# Patient Record
Sex: FEMALE | Race: WHITE | NOT HISPANIC OR LATINO | Employment: PART TIME | URBAN - METROPOLITAN AREA
[De-identification: names, ages, dates, MRNs, and addresses within clinical notes are randomized per-mention and may not be internally consistent; named-entity substitution may affect disease eponyms.]

---

## 2017-01-16 ENCOUNTER — ALLSCRIPTS OFFICE VISIT (OUTPATIENT)
Dept: OTHER | Facility: OTHER | Age: 52
End: 2017-01-16

## 2017-02-14 ENCOUNTER — GENERIC CONVERSION - ENCOUNTER (OUTPATIENT)
Dept: OTHER | Facility: OTHER | Age: 52
End: 2017-02-14

## 2017-03-20 ENCOUNTER — ALLSCRIPTS OFFICE VISIT (OUTPATIENT)
Dept: OTHER | Facility: OTHER | Age: 52
End: 2017-03-20

## 2017-03-20 DIAGNOSIS — F33.1 MAJOR DEPRESSIVE DISORDER, RECURRENT, MODERATE (HCC): ICD-10-CM

## 2017-03-25 ENCOUNTER — APPOINTMENT (OUTPATIENT)
Dept: LAB | Facility: CLINIC | Age: 52
End: 2017-03-25
Payer: COMMERCIAL

## 2017-03-25 ENCOUNTER — TRANSCRIBE ORDERS (OUTPATIENT)
Dept: LAB | Facility: CLINIC | Age: 52
End: 2017-03-25

## 2017-03-25 DIAGNOSIS — F33.1 MAJOR DEPRESSIVE DISORDER, RECURRENT, MODERATE (HCC): ICD-10-CM

## 2017-03-25 LAB
ALBUMIN SERPL BCP-MCNC: 4 G/DL (ref 3.5–5)
ALP SERPL-CCNC: 64 U/L (ref 46–116)
ALT SERPL W P-5'-P-CCNC: 24 U/L (ref 12–78)
AST SERPL W P-5'-P-CCNC: 18 U/L (ref 5–45)
BILIRUB DIRECT SERPL-MCNC: 0.12 MG/DL (ref 0–0.2)
BILIRUB SERPL-MCNC: 0.3 MG/DL (ref 0.2–1)
PROT SERPL-MCNC: 7.4 G/DL (ref 6.4–8.2)

## 2017-03-25 PROCEDURE — 80076 HEPATIC FUNCTION PANEL: CPT

## 2017-03-25 PROCEDURE — 36415 COLL VENOUS BLD VENIPUNCTURE: CPT

## 2017-05-09 ENCOUNTER — ALLSCRIPTS OFFICE VISIT (OUTPATIENT)
Dept: OTHER | Facility: OTHER | Age: 52
End: 2017-05-09

## 2017-05-23 ENCOUNTER — GENERIC CONVERSION - ENCOUNTER (OUTPATIENT)
Dept: OTHER | Facility: OTHER | Age: 52
End: 2017-05-23

## 2017-06-14 ENCOUNTER — ANESTHESIA EVENT (OUTPATIENT)
Dept: GASTROENTEROLOGY | Facility: HOSPITAL | Age: 52
End: 2017-06-14
Payer: COMMERCIAL

## 2017-06-14 ENCOUNTER — GENERIC CONVERSION - ENCOUNTER (OUTPATIENT)
Dept: OTHER | Facility: OTHER | Age: 52
End: 2017-06-14

## 2017-06-14 ENCOUNTER — ANESTHESIA (OUTPATIENT)
Dept: GASTROENTEROLOGY | Facility: HOSPITAL | Age: 52
End: 2017-06-14
Payer: COMMERCIAL

## 2017-06-14 ENCOUNTER — HOSPITAL ENCOUNTER (OUTPATIENT)
Facility: HOSPITAL | Age: 52
Setting detail: OUTPATIENT SURGERY
Discharge: HOME | End: 2017-06-14
Attending: INTERNAL MEDICINE | Admitting: INTERNAL MEDICINE
Payer: COMMERCIAL

## 2017-06-14 VITALS
HEIGHT: 62 IN | BODY MASS INDEX: 26.37 KG/M2 | TEMPERATURE: 96.8 F | HEART RATE: 62 BPM | OXYGEN SATURATION: 100 % | SYSTOLIC BLOOD PRESSURE: 110 MMHG | DIASTOLIC BLOOD PRESSURE: 61 MMHG | RESPIRATION RATE: 20 BRPM | WEIGHT: 143.3 LBS

## 2017-06-14 RX ORDER — PROPOFOL 10 MG/ML
INJECTION, EMULSION INTRAVENOUS AS NEEDED
Status: DISCONTINUED | OUTPATIENT
Start: 2017-06-14 | End: 2017-06-14 | Stop reason: SURG

## 2017-06-14 RX ORDER — PANTOPRAZOLE SODIUM 40 MG/1
40 TABLET, DELAYED RELEASE ORAL DAILY
Status: ON HOLD | COMMUNITY
End: 2017-06-14

## 2017-06-14 RX ORDER — LIDOCAINE HYDROCHLORIDE 10 MG/ML
INJECTION, SOLUTION INFILTRATION; PERINEURAL AS NEEDED
Status: DISCONTINUED | OUTPATIENT
Start: 2017-06-14 | End: 2017-06-14 | Stop reason: SURG

## 2017-06-14 RX ORDER — SODIUM CHLORIDE, SODIUM LACTATE, POTASSIUM CHLORIDE, CALCIUM CHLORIDE 600; 310; 30; 20 MG/100ML; MG/100ML; MG/100ML; MG/100ML
125 INJECTION, SOLUTION INTRAVENOUS CONTINUOUS
Status: DISCONTINUED | OUTPATIENT
Start: 2017-06-14 | End: 2017-06-14 | Stop reason: HOSPADM

## 2017-06-14 RX ORDER — DESVENLAFAXINE 100 MG/1
100 TABLET, EXTENDED RELEASE ORAL DAILY
Status: ON HOLD | COMMUNITY
End: 2017-06-14

## 2017-06-14 RX ORDER — DULOXETIN HYDROCHLORIDE 60 MG/1
60 CAPSULE, DELAYED RELEASE ORAL DAILY
COMMUNITY
End: 2018-03-16 | Stop reason: SDUPTHER

## 2017-06-14 RX ADMIN — PROPOFOL 20 MG: 10 INJECTION, EMULSION INTRAVENOUS at 08:12

## 2017-06-14 RX ADMIN — PROPOFOL 20 MG: 10 INJECTION, EMULSION INTRAVENOUS at 08:05

## 2017-06-14 RX ADMIN — PROPOFOL 20 MG: 10 INJECTION, EMULSION INTRAVENOUS at 08:07

## 2017-06-14 RX ADMIN — PROPOFOL 20 MG: 10 INJECTION, EMULSION INTRAVENOUS at 08:10

## 2017-06-14 RX ADMIN — PROPOFOL 20 MG: 10 INJECTION, EMULSION INTRAVENOUS at 08:20

## 2017-06-14 RX ADMIN — PROPOFOL 20 MG: 10 INJECTION, EMULSION INTRAVENOUS at 08:14

## 2017-06-14 RX ADMIN — PROPOFOL 20 MG: 10 INJECTION, EMULSION INTRAVENOUS at 08:18

## 2017-06-14 RX ADMIN — PROPOFOL 20 MG: 10 INJECTION, EMULSION INTRAVENOUS at 08:11

## 2017-06-14 RX ADMIN — PROPOFOL 20 MG: 10 INJECTION, EMULSION INTRAVENOUS at 08:16

## 2017-06-14 RX ADMIN — PROPOFOL 100 MG: 10 INJECTION, EMULSION INTRAVENOUS at 08:03

## 2017-06-14 RX ADMIN — SODIUM CHLORIDE, SODIUM LACTATE, POTASSIUM CHLORIDE, AND CALCIUM CHLORIDE 125 ML/HR: .6; .31; .03; .02 INJECTION, SOLUTION INTRAVENOUS at 07:56

## 2017-06-14 RX ADMIN — LIDOCAINE HYDROCHLORIDE 20 MG: 10 INJECTION, SOLUTION INFILTRATION; PERINEURAL at 08:03

## 2017-06-14 RX ADMIN — PROPOFOL 20 MG: 10 INJECTION, EMULSION INTRAVENOUS at 08:09

## 2017-06-29 ENCOUNTER — GENERIC CONVERSION - ENCOUNTER (OUTPATIENT)
Dept: OTHER | Facility: OTHER | Age: 52
End: 2017-06-29

## 2017-06-29 ENCOUNTER — ALLSCRIPTS OFFICE VISIT (OUTPATIENT)
Dept: OTHER | Facility: OTHER | Age: 52
End: 2017-06-29

## 2017-06-29 DIAGNOSIS — I10 ESSENTIAL (PRIMARY) HYPERTENSION: ICD-10-CM

## 2017-06-29 DIAGNOSIS — R73.01 IMPAIRED FASTING GLUCOSE: ICD-10-CM

## 2017-07-08 ENCOUNTER — APPOINTMENT (OUTPATIENT)
Dept: LAB | Facility: CLINIC | Age: 52
End: 2017-07-08
Payer: COMMERCIAL

## 2017-07-08 DIAGNOSIS — R73.01 IMPAIRED FASTING GLUCOSE: ICD-10-CM

## 2017-07-08 DIAGNOSIS — I10 ESSENTIAL (PRIMARY) HYPERTENSION: ICD-10-CM

## 2017-07-08 LAB
ALBUMIN SERPL BCP-MCNC: 4 G/DL (ref 3.5–5)
ALP SERPL-CCNC: 65 U/L (ref 46–116)
ALT SERPL W P-5'-P-CCNC: 19 U/L (ref 12–78)
ANION GAP SERPL CALCULATED.3IONS-SCNC: 6 MMOL/L (ref 4–13)
AST SERPL W P-5'-P-CCNC: 15 U/L (ref 5–45)
BASOPHILS # BLD AUTO: 0.03 THOUSANDS/ΜL (ref 0–0.1)
BASOPHILS NFR BLD AUTO: 1 % (ref 0–1)
BILIRUB SERPL-MCNC: 0.3 MG/DL (ref 0.2–1)
BUN SERPL-MCNC: 19 MG/DL (ref 5–25)
CALCIUM SERPL-MCNC: 9.3 MG/DL (ref 8.3–10.1)
CHLORIDE SERPL-SCNC: 105 MMOL/L (ref 100–108)
CHOLEST SERPL-MCNC: 213 MG/DL (ref 50–200)
CO2 SERPL-SCNC: 28 MMOL/L (ref 21–32)
CREAT SERPL-MCNC: 0.93 MG/DL (ref 0.6–1.3)
EOSINOPHIL # BLD AUTO: 0.18 THOUSAND/ΜL (ref 0–0.61)
EOSINOPHIL NFR BLD AUTO: 3 % (ref 0–6)
ERYTHROCYTE [DISTWIDTH] IN BLOOD BY AUTOMATED COUNT: 12.4 % (ref 11.6–15.1)
EST. AVERAGE GLUCOSE BLD GHB EST-MCNC: 140 MG/DL
GFR SERPL CREATININE-BSD FRML MDRD: >60 ML/MIN/1.73SQ M
GLUCOSE P FAST SERPL-MCNC: 107 MG/DL (ref 65–99)
HBA1C MFR BLD: 6.5 % (ref 4.2–6.3)
HCT VFR BLD AUTO: 37.3 % (ref 34.8–46.1)
HDLC SERPL-MCNC: 57 MG/DL (ref 40–60)
HGB BLD-MCNC: 12.1 G/DL (ref 11.5–15.4)
LDLC SERPL CALC-MCNC: 114 MG/DL (ref 0–100)
LYMPHOCYTES # BLD AUTO: 1.83 THOUSANDS/ΜL (ref 0.6–4.47)
LYMPHOCYTES NFR BLD AUTO: 30 % (ref 14–44)
MCH RBC QN AUTO: 29.4 PG (ref 26.8–34.3)
MCHC RBC AUTO-ENTMCNC: 32.4 G/DL (ref 31.4–37.4)
MCV RBC AUTO: 91 FL (ref 82–98)
MONOCYTES # BLD AUTO: 0.41 THOUSAND/ΜL (ref 0.17–1.22)
MONOCYTES NFR BLD AUTO: 7 % (ref 4–12)
NEUTROPHILS # BLD AUTO: 3.69 THOUSANDS/ΜL (ref 1.85–7.62)
NEUTS SEG NFR BLD AUTO: 59 % (ref 43–75)
PLATELET # BLD AUTO: 245 THOUSANDS/UL (ref 149–390)
PMV BLD AUTO: 10.4 FL (ref 8.9–12.7)
POTASSIUM SERPL-SCNC: 4.2 MMOL/L (ref 3.5–5.3)
PROT SERPL-MCNC: 7.3 G/DL (ref 6.4–8.2)
RBC # BLD AUTO: 4.12 MILLION/UL (ref 3.81–5.12)
SODIUM SERPL-SCNC: 139 MMOL/L (ref 136–145)
TRIGL SERPL-MCNC: 212 MG/DL
WBC # BLD AUTO: 6.14 THOUSAND/UL (ref 4.31–10.16)

## 2017-07-08 PROCEDURE — 80061 LIPID PANEL: CPT

## 2017-07-08 PROCEDURE — 80053 COMPREHEN METABOLIC PANEL: CPT

## 2017-07-08 PROCEDURE — 83036 HEMOGLOBIN GLYCOSYLATED A1C: CPT

## 2017-07-08 PROCEDURE — 36415 COLL VENOUS BLD VENIPUNCTURE: CPT

## 2017-07-08 PROCEDURE — 85025 COMPLETE CBC W/AUTO DIFF WBC: CPT

## 2017-09-26 DIAGNOSIS — Z12.31 ENCOUNTER FOR SCREENING MAMMOGRAM FOR MALIGNANT NEOPLASM OF BREAST: ICD-10-CM

## 2017-09-28 ENCOUNTER — ALLSCRIPTS OFFICE VISIT (OUTPATIENT)
Dept: OTHER | Facility: OTHER | Age: 52
End: 2017-09-28

## 2017-10-03 ENCOUNTER — HOSPITAL ENCOUNTER (OUTPATIENT)
Dept: MAMMOGRAPHY | Facility: HOSPITAL | Age: 52
Discharge: HOME | End: 2017-10-03
Payer: COMMERCIAL

## 2017-10-03 DIAGNOSIS — Z12.31 ENCOUNTER FOR SCREENING MAMMOGRAM FOR MALIGNANT NEOPLASM OF BREAST: ICD-10-CM

## 2017-10-03 PROCEDURE — 77063 BREAST TOMOSYNTHESIS BI: CPT

## 2017-10-03 PROCEDURE — G0202 SCR MAMMO BI INCL CAD: HCPCS

## 2017-11-21 ENCOUNTER — GENERIC CONVERSION - ENCOUNTER (OUTPATIENT)
Dept: OTHER | Facility: OTHER | Age: 52
End: 2017-11-21

## 2017-12-11 ENCOUNTER — ALLSCRIPTS OFFICE VISIT (OUTPATIENT)
Dept: OTHER | Facility: OTHER | Age: 52
End: 2017-12-11

## 2017-12-19 ENCOUNTER — ALLSCRIPTS OFFICE VISIT (OUTPATIENT)
Dept: OTHER | Facility: OTHER | Age: 52
End: 2017-12-19
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
## 2018-01-10 NOTE — MISCELLANEOUS
Message   Recorded as Task   Date: 02/14/2017 11:37 AM, Created By: Carlyn Echevarria   Task Name: Med Renewal Request   Assigned To: Nickie Andrew   Regarding Patient: Judy Arellano, Status: Active   CommentCharline Manriquez - 14 Feb 2017 11:37 AM     TASK CREATED  Caller: Self; Renew Medication; [phone] (Home); [phone] (Work)  Nathan Thompson has a refill of duloxetine on hold from January that was not [facility]  the pharmacy asked if you can send the script electronically to them so it can be filled today  I called Nathan Thompson on home phone # of record and she stated the [address] did not have her Duloxetine Rx there, although my Rx log reports the Rx I sent in 1/2016 as verified for transmission   I confirmed with [facility] that the Rx somehow did not make it there and I e-scribed a new one:  Duloxetine 30mg (1) cap po qd # 30      Plan  Recurrent major depressive episodes, moderate    · DULoxetine HCl - 30 MG Oral Capsule Delayed Release Particles; take 1 cap po  daily    Signatures   Electronically signed by : ANNABELLE Shah; Feb 14 2017  2:44PM EST                       (Author)
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
## 2018-01-11 NOTE — MISCELLANEOUS
Message     Recorded as Task   Date: 04/12/2017 11:48 AM, Created By: Lupe Holden   Task Name: Intake   Assigned To: GI Procedure,BIAN[facility] TEAM   Regarding Patient: Tracy Virgen, Status: Complete   Comment:    AddySheron scott - 12 Apr 2017 11:48 AM     TASK CREATED  Date: [  ]  Screened by: [  ]    Referring Provider: [  ]    Pre- Screening:   Has patient been referred for a routine screening Colonoscopy? Yes   Is the patient over 48years of age? Yes     If the answer is YES to both questions, proceed to the medical questions  Do you have a family history of colon cancer? No    Do you have a personal history of colon polyps? No    Do you have any of the following symptoms? Have you had a coronary or vascular stent within the last year? No    Have you had a heart attack or stroke in the last 6 months? No    Have you had intestinal surgery in the last 3 months? No    Do you have problems with:       Do you use:      Have you been hospitalized in the last Month? No    Have you been diagnosed with a bleeding disorder or anemia? No    Have you had chest pain (angina) or breathing problems  (COPD) in the last 3 months? No    Do you have any difficulty walking up a flight of stairs? No    Have you had Kidney failure or insufficiency? No    Have you had heart valve surgery? No    Are you confined to a wheelchair? No     Do you take,,,,, or other blood thinning medication? No    Have you been diagnosed with Diabetes or are you taking any   Diabetic medications? No    : If patient answers NO to medical questions, then schedule procedure  If patient answers YES to medical questions, then schedule office appointment  Previous Colonoscopy ( if yes)    Date and Facility of last colonoscopy?  [   ]    Patient scheduled for procedure: [  yes ]  Scheduled by: [  Misha Christensen ]  Date: [  ]  Time: [  ]  Provider: Laine Hughes    Location: [facility]    Referral Obtained for procedure: Were instructions Mailed? Was the prep sent to Pharmacy? Comments: [  ]   Jaxon Saenz - 12 Apr 2017 1:22 PM     TASK IN PROGRESS   Lana Green - 12 Apr 2017 1:23 PM     TASK EDITED  Left message on voice mail to contact the office to schedule colonoscopy  Chantel Ortiz - 21 Apr 2017 2:08 PM     TASK EDITED  CALLED PT TO SCHEDULE COLON SCREEN PER PT SHE WOULD LIKE TO SCHEDULE AT [facility]  PLEASE CALL PT TO SCHEDULE PROCEDURE  Peggysilvia Jackyoel - 57 May 2017 4:01 PM     TASK REASSIGNED: Previously Assigned To BEN VALENZUELA,TEAM  Pt  wants colon by the end of June, please  Please call her at [phone]   Tricia Bautista - 23 May 2017 2:27 PM     TASK COMPLETED        Active Problems    1  Encounter for mammogram to establish baseline mammogram (V76 12) (Z12 31)   2  Encounter for routine gynecological examination (V72 31) (Z01 419)   3  Encounter for screening colonoscopy (V76 51) (Z12 11)   4  HTN (hypertension), benign (401 1) (I10)   5  Influenza vaccine needed (V04 81) (Z23)   6  Other anxiety states (300 09) (F41 1)   7  Recurrent major depressive episodes, moderate (296 32) (F33 1)    Current Meds   1  Benazepril-Hydrochlorothiazide 10-12 5 MG Oral Tablet; TAKE 1 TABLET DAILY; Therapy: 93WFC3050 to (Evaluate:03Org1414)  Requested for: 37PTS0327; Last   Rx:23Mar2017 Ordered   2  DULoxetine HCl - 60 MG Oral Capsule Delayed Release Particles; take 1 capsule by   mouth daily; Therapy: 18BOJ3680 to (Evaluate:71Oyl5086)  Requested for: 60DPF4398; Last   TH:87GXN4740 Ordered   3  Pantoprazole Sodium 40 MG Oral Tablet Delayed Release; take 1 tablet by mouth once   daily; Therapy: 39Amp6750 to (Evaluate:20Nov2016)  Requested for: 06Dbv3584; Last   Rx:96Boq0349 Ordered    Allergies    1  No Known Drug Allergies    Plan  Encounter for screening colonoscopy    · Suprep Bowel Prep Oral Solution; USE AS DIRECTED   · COLONOSCOPY (GI, SURG);  Status:Active - Retrospective By Protocol Authorization;   Requested for:86Sgh0113;     Signatures   Electronically signed by : Marianne Conley, ; May 23 2017  2:33PM EST                       (Author)

## 2018-01-12 VITALS
HEIGHT: 62 IN | SYSTOLIC BLOOD PRESSURE: 122 MMHG | DIASTOLIC BLOOD PRESSURE: 62 MMHG | TEMPERATURE: 97.4 F | BODY MASS INDEX: 27.47 KG/M2 | RESPIRATION RATE: 16 BRPM | WEIGHT: 149.25 LBS | HEART RATE: 88 BPM

## 2018-01-12 VITALS
HEIGHT: 62 IN | SYSTOLIC BLOOD PRESSURE: 124 MMHG | HEART RATE: 68 BPM | DIASTOLIC BLOOD PRESSURE: 78 MMHG | BODY MASS INDEX: 27.7 KG/M2 | WEIGHT: 150.5 LBS

## 2018-01-12 NOTE — PROGRESS NOTES
Assessment    1  Depression with anxiety (300 4) (F41 8)   2  HTN (hypertension), benign (401 1) (I10)    Plan  Depression with anxiety    · DULoxetine HCl - 30 MG Oral Capsule Delayed Release Particles; TAKE 1  CAPSULE DAILY  Influenza vaccine needed    · Stop: Influenza  Recurrent major depressive episodes, moderate    · L-methylfolate Calcium 15 MG Oral Tablet   · Pristiq 100 MG Oral Tablet Extended Release 24 Hour    Discussion/Summary    1) HTN  - Instructed pt to only take combo pill  --> Assess BP on next HM f/u (has PAP & mammo ordered per pt)    2) Dep w/anxiety   - cont cymbalta  - cont psych f/u    3) HM  - f/u prn and annually  Self Referrals: No      Chief Complaint  BP recheck      History of Present Illness  45 yo F w/PMH HTN recently started on combo ACE 10/12 5 d/t  but has been taking the combo pill + ACE 10 d/t misunderstanding came to [place] [address] office for BP check, now w/     1) HTN  No sxs hypotension  HX mild glucose impairment @fasting 102  2) Depression w/anxiety  - recent rx change d/t lack of prior SNRI effectiveness- now on cymbalta 30, feeling comfortable and seeing psychiatrist regularly  Review of Systems    Constitutional: No fever, no chills, feels well, no tiredness, no recent weight gain or weight loss  Eyes: No complaints of eye pain, no red eyes, no eyesight problems, no discharge, no dry eyes, no itching of eyes  ENT: no complaints of earache, no loss of hearing, no nose bleeds, no nasal discharge, no sore throat, no hoarseness  Cardiovascular: No complaints of slow heart rate, no fast heart rate, no chest pain, no palpitations, no leg claudication, no lower extremity edema  Respiratory: No complaints of shortness of breath, no wheezing, no cough, no SOB on exertion, no orthopnea, no PND  Gastrointestinal: No complaints of abdominal pain, no constipation, no nausea or vomiting, no diarrhea, no bloody stools     Genitourinary: No complaints of dysuria, no incontinence, no pelvic pain, no dysmenorrhea, no vaginal discharge or bleeding  Musculoskeletal: No complaints of arthralgias, no myalgias, no joint swelling or stiffness, no limb pain or swelling  Integumentary: No complaints of skin rash or lesions, no itching, no skin wounds, no breast pain or lump  Neurological: No complaints of headache, no confusion, no convulsions, no numbness, no dizziness or fainting, no tingling, no limb weakness, no difficulty walking  Psychiatric: Not suicidal, no sleep disturbance, no anxiety or depression, no change in personality, no emotional problems  Endocrine: No complaints of proptosis, no hot flashes, no muscle weakness, no deepening of the voice, no feelings of weakness  Hematologic/Lymphatic: No complaints of swollen glands, no swollen glands in the neck, does not bleed easily, does not bruise easily  Active Problems    1  Encounter for mammogram to establish baseline mammogram (V76 12) (Z12 31)   2  Encounter for screening colonoscopy (V76 51) (Z12 11)   3  HTN (hypertension), benign (401 1) (I10)   4  Influenza vaccine needed (V04 81) (Z23)   5  Recurrent major depressive episodes, moderate (296 32) (F33 1)    Past Medical History    1  History of Gastric reflux (530 81) (K21 9)   2  History of hypertension (V12 59) (Z86 79)    Family History  Mother    1  Family history of cerebrovascular accident (CVA) (V17 1) (Z82 3)  Father    2  Family history of Major depressive disorder, recurrent episode, severe  Sister    3  Family history of Major depressive disorder, recurrent episode, severe  Paternal Aunt    4  Family history of Major depressive disorder, recurrent episode, severe    Social History    · Employed   ·    · Never A Smoker   · Number of children    Current Meds   1  Benazepril-Hydrochlorothiazide 10-12 5 MG Oral Tablet; TAKE 1 TABLET DAILY;    Therapy: 87BEI3519 to (L[place] Primes)  Requested for: 54UEV1279; Last SW:86YQK2563 Ordered   2  DULoxetine HCl - 30 MG Oral Capsule Delayed Release Particles; 1 cap po QD; Therapy: 73TEI6243 to (Evaluate:02Fia4192)  Requested for: 88FIF2244; Last   Rx:07Jun2016 Ordered   3  L-methylfolate Calcium 15 MG Oral Tablet; take one tablet by mouth every day; Therapy: 92EUI9469 to (Last Rx:29Mar2016)  Requested for: 29Mar2016 Ordered   4  Pantoprazole Sodium 40 MG Oral Tablet Delayed Release; take 1 tablet by mouth once   daily; Therapy: 36Gke9444 to (Evaluate:25Fws8639)  Requested for: 05Apr2016; Last   Rx:05Apr2016 Ordered   5  Pristiq 100 MG Oral Tablet Extended Release 24 Hour; take 1/2 of a tablet by mouth every   day for 1 more week; Therapy: 90NCF4897 to (Evaluate:08Jun2016)  Requested for: 94QPW8783; Last   Rx:07Jun2016 Ordered    Allergies    1  No Known Drug Allergies    Vitals  Vital Signs    Recorded: 59Lta2989 04:48PM Recorded: 66PFK8106 94:28UT   Systolic 791 346   Diastolic 70 60   Heart Rate  70   Respiration  16   Temperature  97 3 F   Height  5 ft 2 3 in   Weight  151 lb    BMI Calculated  27 35   BSA Calculated  1 7     Physical Exam    Constitutional   General appearance: No acute distress, well appearing and well nourished  Pulmonary   Respiratory effort: No increased work of breathing or signs of respiratory distress  Auscultation of lungs: Clear to auscultation  Cardiovascular   Auscultation of heart: Normal rate and rhythm, normal S1 and S2, without murmurs  Examination of extremities for edema and/or varicosities: Normal     Abdomen   Abdomen: Non-tender, no masses  Liver and spleen: No hepatomegaly or splenomegaly  Psychiatric   Orientation to person, place, and time: Normal     Mood and affect: Normal          Attending Note  Attending Note: I discussed the case with the Resident and reviewed the Resident's note, I supervised the Resident and I agree with the Resident management plan as it was presented to me   Level of Participation: I was present in clinic, but did not examine the patient        Future Appointments    Date/Time Provider Specialty Site   09/06/2016 05:00 PM ANNABELLE Cummings Psychiatry [facility] 81     Signatures   Electronically signed by : Pierce Beach DO; Jul 15 2016  5:06PM EST                       (Author)    Electronically signed by : Jackie Bello DO; Jul 26 2016  9:01AM EST                       (Author)
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
## 2018-01-14 VITALS
DIASTOLIC BLOOD PRESSURE: 73 MMHG | HEART RATE: 88 BPM | BODY MASS INDEX: 26.87 KG/M2 | WEIGHT: 146 LBS | HEIGHT: 62 IN | SYSTOLIC BLOOD PRESSURE: 113 MMHG
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
## 2018-01-15 VITALS
SYSTOLIC BLOOD PRESSURE: 132 MMHG | BODY MASS INDEX: 27.6 KG/M2 | HEIGHT: 62 IN | WEIGHT: 150 LBS | DIASTOLIC BLOOD PRESSURE: 86 MMHG | HEART RATE: 73 BPM

## 2018-01-15 NOTE — MISCELLANEOUS
Message   Recorded as Task   Date: 08/23/2016 09:25 AM, Created By: Carol Silva   Task Name: Med Renewal Request   Assigned To: Daniel Quick   Regarding Patient: Sravani Rogers, Status: Active   Comment:    Katherine Goldberg - 23 Aug 2016 9:25 AM     TASK CREATED  Cymbalta 30mg, qd, Orvamsi called again for her Duloxetine, but instead would like it to go to the [facility]  I confirmed with this pharmacy that Dr Jerad Fuentes Rx is not in the system   I gave a verbal order for the following, in case the [employer] system would not send:  Duloxetine 30mg (1) cap po qd # 30      Plan  Recurrent major depressive episodes, moderate    · DULoxetine HCl - 30 MG Oral Capsule Delayed Release Particles; 1 cap po QD    Signatures   Electronically signed by : ANNABELLE Silveira; Aug 23 2016  5:30PM EST                       (Author)
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
## 2018-01-18 NOTE — PSYCH
Message  Message Free Text Note Form: Covering for Dr Patti Choi  Renewed Pristiq 100 mg daily and Deplin 15 mg daily  90 day supply      Active Problems    1  Recurrent major depressive episodes, moderate (296 32) (F33 1)    Current Meds   1  Benazepril HCl - 10 MG Oral Tablet; TAKE 1 TABLET DAILY AS DIRECTED; Therapy: 81Lpg2690 to (Evaluate:80Awj3480); Last Rx:87Ivq3863 Ordered   2  Deplin 15 15-90 314 MG Oral Capsule; take 1 capsule daily; Therapy: 77GBY1612 to (Rashawn Buffalo)  Requested for: 38Fts2295; Last   Rx:08Vse3084 Ordered   3  Pantoprazole Sodium 40 MG Oral Tablet Delayed Release; take 1 tablet by mouth once   daily; Therapy: 32Dal7413 to (Evaluate:24Nov2015); Last Rx:58Adb2347 Ordered   4  Pristiq 100 MG Oral Tablet Extended Release 24 Hour; Take 1 tablet daily; Therapy: 77LWE6372 to (Evaluate:18Feb2016)  Requested for: 92XAS8158; Last   Rx:20Nov2015 Ordered    Allergies    1  No Known Drug Allergies    Plan    1  Deplin 15 15-90 314 MG Oral Capsule; take 1 capsule daily   2  Pristiq 100 MG Oral Tablet Extended Release 24 Hour;  Take 1 tablet daily    Signatures   Electronically signed by : Duglas Man MD; Mar 24 2016 10:56AM EST                       (Author)

## 2018-01-18 NOTE — MISCELLANEOUS
Message   Recorded as Task   Date: 08/22/2016 10:37 AM, Created By: iVpul Jimenez   Task Name: Med Renewal Request   Assigned To: Dirk Schmitz   Regarding Patient: Erin Narvaez, Status: Active   Comment:    Vipul Jimenez - 22 Aug 2016 10:37 AM     TASK CREATED  Caller: Self; Renew Medication; [phone] (Home); [phone] (Work)  pt id requestining refills on   Cymbalta 30mg    pt states she has no refills  homestadeborah gilbert  next appt 09/06/2016  in All scripts it shows 5 refills   Quintin Gramajo left msg requesting more Cymbalta, but Rx log shows her PMD office of Dr Vidhya Mix recorded a Rx apparently given for a 30 day supply with 5 refills on 7/15/2016 I called the Abdiaziz Odom at confirmed with pharmacist that there is no new Cymbalta Rx from 7/2016  That pharmacist then directed me to [facility] where Pt is now going for her Rx, however, they were closed for the day  I then called the Pt on home phone # of record and discussed the above  Pt will call the [address] tomorrow and talk to PMD as needed to find out what happened to the Rx        Signatures   Electronically signed by : ANNABELLE Shannon; Aug 22 2016  6:38PM EST                       (Author)

## 2018-01-22 VITALS
SYSTOLIC BLOOD PRESSURE: 122 MMHG | WEIGHT: 144 LBS | BODY MASS INDEX: 26.5 KG/M2 | HEIGHT: 62 IN | DIASTOLIC BLOOD PRESSURE: 70 MMHG

## 2018-01-22 VITALS
BODY MASS INDEX: 27.23 KG/M2 | SYSTOLIC BLOOD PRESSURE: 134 MMHG | HEART RATE: 65 BPM | WEIGHT: 148 LBS | DIASTOLIC BLOOD PRESSURE: 76 MMHG | HEIGHT: 62 IN

## 2018-01-23 VITALS — HEIGHT: 62 IN | WEIGHT: 142 LBS | BODY MASS INDEX: 26.13 KG/M2
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
## 2018-01-23 NOTE — MISCELLANEOUS
Provider Comments  Provider Comments:   patient did not show for scheduled appointment today  Signatures   Electronically signed by :  Yolande Goncalves, ; Dec 20 2017 12:03PM EST                       (Author)

## 2018-02-14 ENCOUNTER — TELEPHONE (OUTPATIENT)
Dept: OBGYN CLINIC | Facility: CLINIC | Age: 53
End: 2018-02-14

## 2018-02-14 DIAGNOSIS — N95.2 VAGINAL ATROPHY: Primary | ICD-10-CM

## 2018-02-14 RX ORDER — ESTRADIOL 10 UG/1
10 INSERT VAGINAL 2 TIMES WEEKLY
Qty: 8 TABLET | Refills: 2 | Status: SHIPPED | OUTPATIENT
Start: 2018-02-15 | End: 2018-12-12 | Stop reason: SDUPTHER

## 2018-02-14 NOTE — TELEPHONE ENCOUNTER
Pt here 11/2017 & discussed lubricant for vaginal atrophy - she feels she has dryness continually & has been using Astroglide with intercourse & still having discomfort - had prev discussed using vaginal estrogen & is willing to try if you are recommending

## 2018-03-07 NOTE — PSYCH
Message  Patient No Show Letter - Behavioral Health:     Date: 09/07/2016     Dear Adilson Waldron,     We missed seeing you for a scheduled appointment at [facility] on 9-6-16 at [address] [facility] with Kendal Brunson  Our goal is to offer the best possible care to our patients, so we are concerned when you are unable to keep a scheduled appointment  Please call us at [phone] so that we can reschedule the appointment for a date and time that will work for you  We understand that circumstances may arise which make it impossible for you to keep a scheduled appointment  Should this happen in the future, please call us as soon as you know the appointment will be missed  The earlier you let us know, the more likely we can offer your scheduled appointment time to another patient  We hope to hear from you soon       Sincerely,   Kendal ALANIS      Signatures   Electronically signed by : ANNABELLE Georges; Sep  7 2016  9:14AM EST                       (Author)

## 2018-03-13 PROBLEM — F41.1 ANXIETY STATE: Status: ACTIVE | Noted: 2017-03-20

## 2018-03-13 PROBLEM — G47.00 INSOMNIA: Status: ACTIVE | Noted: 2017-09-28

## 2018-03-13 PROBLEM — G25.81 RESTLESS LEGS SYNDROME: Status: ACTIVE | Noted: 2017-09-28

## 2018-03-16 ENCOUNTER — OFFICE VISIT (OUTPATIENT)
Dept: PSYCHIATRY | Facility: CLINIC | Age: 53
End: 2018-03-16
Payer: COMMERCIAL

## 2018-03-16 VITALS — WEIGHT: 133.5 LBS | HEIGHT: 62 IN | BODY MASS INDEX: 24.56 KG/M2

## 2018-03-16 DIAGNOSIS — I10 HTN (HYPERTENSION), BENIGN: ICD-10-CM

## 2018-03-16 DIAGNOSIS — F33.1 RECURRENT MAJOR DEPRESSIVE EPISODES, MODERATE (HCC): ICD-10-CM

## 2018-03-16 DIAGNOSIS — G47.00 INSOMNIA: ICD-10-CM

## 2018-03-16 DIAGNOSIS — F41.1 ANXIETY STATE: Primary | ICD-10-CM

## 2018-03-16 PROCEDURE — 99214 OFFICE O/P EST MOD 30 MIN: CPT | Performed by: PHYSICIAN ASSISTANT

## 2018-03-16 RX ORDER — HYDROXYZINE HYDROCHLORIDE 25 MG/1
TABLET, FILM COATED ORAL
Qty: 180 TABLET | Refills: 0 | Status: SHIPPED | OUTPATIENT
Start: 2018-03-16 | End: 2020-03-03

## 2018-03-16 RX ORDER — HYDROXYZINE HYDROCHLORIDE 25 MG/1
TABLET, FILM COATED ORAL
COMMUNITY
Start: 2017-12-11 | End: 2018-03-16 | Stop reason: SDUPTHER

## 2018-03-16 RX ORDER — DULOXETIN HYDROCHLORIDE 60 MG/1
60 CAPSULE, DELAYED RELEASE ORAL DAILY
Qty: 90 CAPSULE | Refills: 0 | Status: SHIPPED | OUTPATIENT
Start: 2018-03-16 | End: 2018-06-18 | Stop reason: SDUPTHER
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
## 2018-03-28 ENCOUNTER — TELEPHONE (OUTPATIENT)
Dept: BEHAVIORAL HEALTH UNIT | Facility: HOSPITAL | Age: 53
End: 2018-03-28

## 2018-03-28 NOTE — TELEPHONE ENCOUNTER
----- Message from Ethan Christopher MA sent at 3/20/2018 12:00 PM EDT -----  Regarding: cx appt   Was her appt for 3/15/18 to be cancelled, since you saw her 3/16/18 and did a Tx Plan

## 2018-04-24 ENCOUNTER — OFFICE VISIT (OUTPATIENT)
Dept: FAMILY MEDICINE CLINIC | Facility: CLINIC | Age: 53
End: 2018-04-24
Payer: COMMERCIAL

## 2018-04-24 VITALS
TEMPERATURE: 98.5 F | BODY MASS INDEX: 25.58 KG/M2 | RESPIRATION RATE: 18 BRPM | WEIGHT: 139 LBS | HEART RATE: 72 BPM | HEIGHT: 62 IN | DIASTOLIC BLOOD PRESSURE: 70 MMHG | SYSTOLIC BLOOD PRESSURE: 110 MMHG

## 2018-04-24 DIAGNOSIS — R73.09 ELEVATED HEMOGLOBIN A1C: ICD-10-CM

## 2018-04-24 DIAGNOSIS — I10 HTN (HYPERTENSION), BENIGN: Primary | ICD-10-CM

## 2018-04-24 PROCEDURE — 3078F DIAST BP <80 MM HG: CPT | Performed by: FAMILY MEDICINE

## 2018-04-24 PROCEDURE — 3074F SYST BP LT 130 MM HG: CPT | Performed by: FAMILY MEDICINE

## 2018-04-24 PROCEDURE — 99214 OFFICE O/P EST MOD 30 MIN: CPT | Performed by: FAMILY MEDICINE

## 2018-04-24 NOTE — PROGRESS NOTES
Susi Rojas 1965 female MRN: [id number]    Family Medicine Follow-up Visit    ASSESSMENT/PLAN  Problem List Items Addressed This Visit     HTN (hypertension), benign - Primary    Relevant Medications    benazepril-hydrochlorthiazide (LOTENSIN HCT) 10-12 5 MG per tablet    Other Relevant Orders    Comprehensive metabolic panel    Lipid panel    Elevated hemoglobin A1c    Relevant Orders    HEMOGLOBIN A1C W/ EAG ESTIMATION       DASH DIET   F/U 6 months    Future Appointments  Date Time Provider Yandy Laguerre   6/18/2018 10:30 AM Omi Masters PA-C [facility]          SUBJECTIVE  CC: Hypertension      HPI:  Susi Rojas is a 52 y o  female who presents for follow up HTN/ elevated A1c " Not exercizing enough" - 2 days/week  Has cut back on non nutritious foods  Taking medications without adverse effects    Denies CP/SOB/HA/Edema  Denies polyuria/dypsia/phagia    Review of Systems   Constitutional: Negative for appetite change, fatigue, fever and unexpected weight change  Respiratory: Negative for cough, chest tightness and shortness of breath  Cardiovascular: Negative  Endocrine: Negative  Musculoskeletal: Negative for arthralgias and myalgias  Psychiatric/Behavioral: Negative for suicidal ideas  F/b counselor and psychiatry       Historical Information   The patient history was reviewed as follows:    Past Medical History:   Diagnosis Date    GERD (gastroesophageal reflux disease)     Hypertension     Restless legs      Past Surgical History:   Procedure Laterality Date    COLPOPEXY VAGINAL EXTRAPERITONEAL (VEC) WITH INSERTION PUBOVAGINAL SLING      NASAL SEPTUM SURGERY      AR COLONOSCOPY FLX DX W/COLLJ SPEC WHEN PFRMD N/A 6/14/2017    Procedure: COLONOSCOPY;  Surgeon: Mian Valenzuela MD;  [facility];   Service: Gastroenterology    TONSILLECTOMY      TUBAL LIGATION       Family History   Problem Relation Age of Onset    Stroke Mother Cerebrovascular accident (CVA)    Depression Father      Major depressive disorder, recurrent episode, severe    Depression Sister      Major depressive disorder, recurrent episode, severe    Depression Paternal Aunt      Major depressive disorder, recurrent episode, severe      Social History   History   Alcohol Use No     Comment: Pt denies any h/o ETOH or illicit drug use or abuse  History   Drug Use No     Comment: Pt denies any h/o ETOH or illicit drug use or abuse  History   Smoking Status    Never Smoker   Smokeless Tobacco    Never Used       Medications:     Current Outpatient Prescriptions:     benazepril-hydrochlorthiazide (LOTENSIN HCT) 10-12 5 MG per tablet, Take 1 tablet by mouth daily, Disp: 90 tablet, Rfl: 3    DULoxetine (CYMBALTA) 60 mg delayed release capsule, Take 1 capsule (60 mg total) by mouth daily for 90 days, Disp: 90 capsule, Rfl: 0    Estradiol (VAGIFEM) 10 MCG TABS, Insert 1 tablet (10 mcg total) into the vagina 2 (two) times a week, Disp: 8 tablet, Rfl: 2    hydrOXYzine HCL (ATARAX) 25 mg tablet, Take 1 tab by mouth daily to twice daily as needed for anxiety or insomnia, Disp: 180 tablet, Rfl: 0  No Known Allergies    OBJECTIVE    Vitals:   Vitals:    04/24/18 0936   BP: 110/70   Pulse: 72   Resp: 18   Temp: 98 5 °F (36 9 °C)   Weight: 63 kg (139 lb)   Height: 5' 2" (1 575 m)           Physical Exam   Constitutional: She is oriented to person, place, and time  She appears well-developed and well-nourished  HENT:   Head: Normocephalic and atraumatic  Mouth/Throat: Oropharynx is clear and moist    Eyes: Conjunctivae are normal  Pupils are equal, round, and reactive to light  No scleral icterus  Neck: No JVD present  No thyromegaly present  Cardiovascular: Normal rate, regular rhythm, normal heart sounds and intact distal pulses  Pulmonary/Chest: Effort normal and breath sounds normal    Musculoskeletal: She exhibits no edema     Lymphadenopathy:     She has no cervical adenopathy  Neurological: She is alert and oriented to person, place, and time  Skin: Skin is warm and dry  Psychiatric: She has a normal mood and affect                    Abram Urena, [address] [place] [address]   4/24/2018

## 2018-04-24 NOTE — PATIENT INSTRUCTIONS
DASH Eating Plan   AMBULATORY CARE:   The DASH (Dietary Approaches to Stop Hypertension) Eating Plan  is designed to help prevent or lower high blood pressure  It can also help to lower LDL (bad) cholesterol and decrease your risk for heart disease  The plan is low in sodium, sugar, unhealthy fats, and total fat  It is high in potassium, calcium, magnesium, and fiber  These nutrients are added when you eat more fruits, vegetables, and whole grains  Your sodium limit each day: Your dietitian will tell you how much sodium is safe for you to have each day  People with high blood pressure should have no more than 1,500 to 2,300 mg of sodium in a day  A teaspoon (tsp) of salt has 2,300 mg of sodium  This may seem like a difficult goal, but small changes to the foods you eat can make a big difference  Your healthcare provider or dietitian can help you create a meal plan that follows your sodium limit  How to limit sodium:   · Read food labels  Food labels can help you choose foods that are low in sodium  The amount of sodium is listed in milligrams (mg)  The % Daily Value (DV) column tells you how much of your daily needs are met by 1 serving of the food for each nutrient listed  Choose foods that have less than 5% of the DV of sodium  These foods are considered low in sodium  Foods that have 20% or more of the DV of sodium are considered high in sodium  Avoid foods that have more than 300 mg of sodium in each serving  Choose foods that say low-sodium, reduced-sodium, or no salt added on the food label  · Avoid salt  Do not salt food at the table, and add very little salt to foods during cooking  Use herbs and spices, such as onions, garlic, and salt-free seasonings to add flavor to foods  Try lemon or lime juice or vinegar to give foods a tart flavor  Use hot peppers or a small amount of hot pepper sauce to add a spicy flavor to foods  · Ask about salt substitutes    Ask your healthcare provider if you may use salt substitutes  Some salt substitutes have ingredients that can be harmful if you have certain health conditions  · Choose foods carefully at restaurants  Meals from restaurants, especially fast food restaurants, are often high in sodium  Some restaurants have nutrition information that tells you the amount of sodium in their foods  Ask to have your food prepared with less, or no salt  What you need to know about fats:   · Include healthy fats  Examples are unsaturated fats and omega-3 fatty acids  Unsaturated fats are found in soybean, canola, olive, or sunflower oil, and liquid and soft tub margarines  Omega-3 fatty acids are found in fatty fish, such as salmon, tuna, mackerel, and sardines  It is also found in flaxseed oil and ground flaxseed  · Avoid unhealthy fats  Do not eat unhealthy fats, such as saturated fats and trans fats  Saturated fats are found in foods that contain fat from animals  Examples are fatty meats, whole milk, butter, cream, and other dairy foods  It is also found in shortening, stick margarine, palm oil, and coconut oil  Trans fats are found in fried foods, crackers, chips, and baked goods made with margarine or shortening  Foods to include: With the DASH eating plan, you need to eat a certain number of servings from each food group  This will help you get enough of certain nutrients and limit others  The amount of servings you should eat depends on how many calories you need  Your dietitian can tell you how many calories you need  The number of servings listed next to the food groups below are for people who need about 2,000 calories each day    · Grains:  6 to 8 servings (3 of these servings should be whole-grain foods)    ¨ 1 slice of whole-grain bread     ¨ 1 ounce of dry cereal    ¨ ½ cup of cooked cereal, pasta, or brown rice    · Vegetables and fruits:  4 to 5 servings of fruits and 4 to 5 servings of vegetables    ¨ 1 medium fruit    ¨ ½ cup of frozen, canned (no added salt), or chopped fresh vegetables     ¨ ½ cup of fresh, frozen, dried, or canned fruit (canned in light syrup or fruit juice)    ¨ ½ cup of vegetable or fruit juice    · Dairy:  2 to 3 servings    ¨ 1 cup of nonfat (skim) or 1% milk    ¨ 1½ ounces of fat-free or low-fat cheese    ¨ 6 ounces of nonfat or low-fat yogurt    · Lean meat, poultry, and fish:  6 ounces or less    [employer] (chicken, turkey) with no skin    ¨ Fish (especially fatty fish, such as salmon, fresh tuna, or mackerel)    ¨ Lean beef and pork (loin, round, extra lean hamburger)    ¨ Egg whites and egg substitutes    · Nuts, seeds, and legumes:  4 to 5 servings each week    ¨ ½ cup of cooked beans and peas    ¨ 1½ ounces of unsalted nuts    ¨ 2 tablespoons of peanut butter or seeds    · Sweets and added sugars:  5 or less each week    ¨ 1 tablespoon of sugar, jelly, or jam    ¨ ½ cup of sorbet or gelatin    ¨ 1 cup of lemonade    · Fats:  2 to 3 servings each week    ¨ 1 teaspoon of soft margarine or vegetable oil    ¨ 1 tablespoon of mayonnaise    ¨ 2 tablespoons of salad dressing  Foods to avoid:   · Grains:      [employer], such as doughnuts, pastries, cookies, and biscuits (high in fat and sugar)    ¨ Mixes for cornbread and biscuits, packaged foods, such as bread stuffing, rice and pasta mixes, macaroni and cheese, and instant cereals (high in sodium)    · Fruits and vegetables:      ¨ Regular, canned vegetables (high in sodium)    ¨ Sauerkraut, pickled vegetables, and other foods prepared in brine (high in sodium)    ¨ Fried vegetables or vegetables in butter or high-fat sauces    ¨ Fruit in cream or butter sauce (high in fat)    · Dairy:      ¨ Whole milk, 2% milk, and cream (high in fat)    ¨ Regular cheese and processed cheese (high in fat and sodium)    · Meats and protein foods:      ¨ Smoked or cured meat, such as corned beef, solares, ham, hot dogs, and sausage (high in fat and sodium)    ¨ Canned beans and canned meats or spreads, such as potted meats, sardines, anchovies, and imitation seafood (high in sodium)    ¨ Deli or lunch meats, such as bologna, ham, turkey, and roast beef (high in sodium)    ¨ High-fat meat (T-bone steak, regular hamburger, and ribs)    ¨ Whole eggs and egg yolks (high in fat)    · Other:      ¨ Seasonings made with salt, such as garlic salt, celery salt, onion salt, seasoned salt, meat tenderizers, and monosodium glutamate (MSG)    ¨ Miso soup and canned or dried soup mixes (high in sodium)    ¨ Regular soy sauce, barbecue sauce, teriyaki sauce, steak sauce, Worcestershire sauce, and most flavored vinegars (high in sodium)    ¨ Regular condiments, such as mustard, ketchup, and salad dressings (high in sodium)    ¨ Gravy and sauces, such as Sammy or cheese sauces (high in sodium and fat)    ¨ Drinks high in sugar, such as soda or fruit drinks    [employer] foods, such as salted chips, popcorn, pretzels, pork rinds, salted crackers, and salted nuts    ¨ Frozen foods, such as dinners, entrees, vegetables with sauces, and breaded meats (high in sodium)  Other guidelines to follow:   · Maintain a healthy weight  Your risk for heart disease is higher if you are overweight  Your healthcare provider may suggest that you lose weight if you are overweight  You can lose weight by eating fewer calories and foods that have added sugars and fat  The DASH meal plan can help you do this  Decrease calories by eating smaller portions at each meal and fewer snacks  Ask your healthcare provider for more information about how to lose weight  · Exercise regularly  Regular exercise can help you reach or maintain a healthy weight  Regular exercise can also help decrease your blood pressure and improve your cholesterol levels  Get 30 minutes or more of moderate exercise each day of the week  To lose weight, get at least 60 minutes of exercise  Talk to your healthcare provider about the best exercise program for you      · Limit alcohol  Women should limit alcohol to 1 drink a day  Men should limit alcohol to 2 drinks a day  A drink of alcohol is 12 ounces of beer, 5 ounces of wine, or 1½ ounces of liquor  © 2017 [address] Information is for End User's use only and may not be sold, redistributed or otherwise used for commercial purposes  All illustrations and images included in CareNotes® are the copyrighted property of A D A M , Inc  or [employer]  The above information is an  only  It is not intended as medical advice for individual conditions or treatments  Talk to your doctor, nurse or pharmacist before following any medical regimen to see if it is safe and effective for you  Prediabetes   AMBULATORY CARE:   Prediabetes  is a blood glucose level that is higher than normal  It is not high enough to be considered diabetes  Prediabetes increases your risk for type 2 diabetes and heart disease  Common symptoms include the following:  Prediabetes may not cause any symptoms, or it may cause symptoms similar to diabetes  These may include any of the following:  · More hunger or thirst than usual     · Frequent urination     · Weight loss without trying     · Blurred vision  Contact your healthcare provider if:   · You have more hunger or thirst than usual      · You are urinating more frequently than normal      · You lose weight without trying  · You have blurred vision  · You have questions or concerns about your condition or care  Medicines  may be given to control your blood sugar levels  Medicine may also be given to lower high blood pressure and high cholesterol  Manage prediabetes:  Weight loss and exercise work best to delay or prevent type 2 diabetes  You can decrease your risk of type 2 diabetes by doing the following:  · Lose weight if you are overweight  A weight loss of 7% of your body weight can help to lower your blood sugar level   For example, if you weigh 200 pounds, you should lose 14 pounds  · Exercise regularly  Exercise can help decrease your blood sugar level  It can also help to decrease your risk of heart disease and help you lose weight  Adults should exercise for at least 150 minutes every week  Spread this amount of exercise over at least 3 days a week  Do not skip exercise more than 2 days in a row  Children should exercise for at least 60 minutes on most days of the week  Examples of exercise include walking or swimming  Do not sit for longer than 30 minutes  Work with your healthcare provider to create an exercise plan  · Decrease the amount of calories you eat to help you lose weight  Eat a variety of fruits and vegetables, and eat whole-grain foods more often  Choose dairy foods, meat, and other protein foods that are low in fat  Eat fewer sweets such as candy, cookies, regular soda, and sweetened drinks  You can also decrease calories by eating smaller portion sizes  Work with your healthcare provider or dietitian to develop a meal plan that is right for you  · Do not smoke  Nicotine can damage blood vessels  Do not use e-cigarettes or smokeless tobacco in place of cigarettes or to help you quit  They still contain nicotine  Ask your healthcare provider for information if you currently smoke and need help quitting  Follow up with your healthcare provider as directed: You will need to return every year to get tested for diabetes  Write down your questions so you remember to ask them during your visits  © 2017 [address] Information is for End User's use only and may not be sold, redistributed or otherwise used for commercial purposes  All illustrations and images included in CareNotes® are the copyrighted property of A D A M , Inc  or [employer]  The above information is an  only  It is not intended as medical advice for individual conditions or treatments   Talk to your doctor, nurse or pharmacist before following any medical regimen to see if it is safe and effective for you

## 2018-06-18 ENCOUNTER — OFFICE VISIT (OUTPATIENT)
Dept: PSYCHIATRY | Facility: CLINIC | Age: 53
End: 2018-06-18
Payer: COMMERCIAL

## 2018-06-18 VITALS
BODY MASS INDEX: 24.37 KG/M2 | HEIGHT: 62 IN | HEART RATE: 62 BPM | SYSTOLIC BLOOD PRESSURE: 112 MMHG | DIASTOLIC BLOOD PRESSURE: 73 MMHG | WEIGHT: 132.4 LBS

## 2018-06-18 DIAGNOSIS — F33.1 RECURRENT MAJOR DEPRESSIVE EPISODES, MODERATE (HCC): ICD-10-CM

## 2018-06-18 DIAGNOSIS — F51.04 PSYCHOPHYSIOLOGICAL INSOMNIA: ICD-10-CM

## 2018-06-18 DIAGNOSIS — F41.1 ANXIETY STATE: Primary | ICD-10-CM

## 2018-06-18 PROCEDURE — 99213 OFFICE O/P EST LOW 20 MIN: CPT | Performed by: PHYSICIAN ASSISTANT

## 2018-06-18 RX ORDER — DULOXETIN HYDROCHLORIDE 60 MG/1
60 CAPSULE, DELAYED RELEASE ORAL DAILY
Qty: 90 CAPSULE | Refills: 0 | Status: SHIPPED | OUTPATIENT
Start: 2018-06-18 | End: 2018-09-17 | Stop reason: SDUPTHER
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
## 2018-07-30 ENCOUNTER — TRANSCRIBE ORDERS (OUTPATIENT)
Dept: ADMINISTRATIVE | Facility: HOSPITAL | Age: 53
End: 2018-07-30

## 2018-07-30 DIAGNOSIS — Z00.8 HEALTH EXAMINATION IN POPULATION SURVEY: Primary | ICD-10-CM

## 2018-07-30 DIAGNOSIS — Z12.39 SCREENING BREAST EXAMINATION: ICD-10-CM

## 2018-08-21 ENCOUNTER — APPOINTMENT (OUTPATIENT)
Dept: LAB | Facility: CLINIC | Age: 53
End: 2018-08-21
Payer: COMMERCIAL

## 2018-08-21 ENCOUNTER — HOSPITAL ENCOUNTER (OUTPATIENT)
Dept: RADIOLOGY | Facility: HOSPITAL | Age: 53
Discharge: HOME | End: 2018-08-21
Payer: COMMERCIAL

## 2018-08-21 ENCOUNTER — OFFICE VISIT (OUTPATIENT)
Dept: FAMILY MEDICINE CLINIC | Facility: CLINIC | Age: 53
End: 2018-08-21
Payer: COMMERCIAL

## 2018-08-21 VITALS
TEMPERATURE: 96.7 F | HEIGHT: 62 IN | RESPIRATION RATE: 16 BRPM | SYSTOLIC BLOOD PRESSURE: 126 MMHG | HEART RATE: 60 BPM | DIASTOLIC BLOOD PRESSURE: 80 MMHG | WEIGHT: 136.8 LBS | BODY MASS INDEX: 25.17 KG/M2

## 2018-08-21 DIAGNOSIS — R73.09 ELEVATED HEMOGLOBIN A1C: ICD-10-CM

## 2018-08-21 DIAGNOSIS — I10 HTN (HYPERTENSION), BENIGN: ICD-10-CM

## 2018-08-21 DIAGNOSIS — Z12.39 SCREENING BREAST EXAMINATION: ICD-10-CM

## 2018-08-21 DIAGNOSIS — Z00.8 HEALTH EXAMINATION IN POPULATION SURVEY: ICD-10-CM

## 2018-08-21 DIAGNOSIS — M25.552 PAIN OF LEFT HIP JOINT: Primary | ICD-10-CM

## 2018-08-21 DIAGNOSIS — M25.552 PAIN OF LEFT HIP JOINT: ICD-10-CM

## 2018-08-21 LAB
ALBUMIN SERPL BCP-MCNC: 4.3 G/DL (ref 3.5–5)
ALP SERPL-CCNC: 55 U/L (ref 46–116)
ALT SERPL W P-5'-P-CCNC: 21 U/L (ref 12–78)
ANION GAP SERPL CALCULATED.3IONS-SCNC: 4 MMOL/L (ref 4–13)
AST SERPL W P-5'-P-CCNC: 14 U/L (ref 5–45)
BILIRUB SERPL-MCNC: 0.3 MG/DL (ref 0.2–1)
BUN SERPL-MCNC: 22 MG/DL (ref 5–25)
CALCIUM SERPL-MCNC: 9.7 MG/DL (ref 8.3–10.1)
CHLORIDE SERPL-SCNC: 103 MMOL/L (ref 100–108)
CHOLEST SERPL-MCNC: 236 MG/DL (ref 50–200)
CO2 SERPL-SCNC: 30 MMOL/L (ref 21–32)
CREAT SERPL-MCNC: 0.87 MG/DL (ref 0.6–1.3)
EST. AVERAGE GLUCOSE BLD GHB EST-MCNC: 123 MG/DL
GFR SERPL CREATININE-BSD FRML MDRD: 75 ML/MIN/1.73SQ M
GLUCOSE SERPL-MCNC: 97 MG/DL (ref 65–140)
HBA1C MFR BLD: 5.9 % (ref 4.2–6.3)
HDLC SERPL-MCNC: 63 MG/DL (ref 40–60)
LDLC SERPL CALC-MCNC: 149 MG/DL (ref 0–100)
NONHDLC SERPL-MCNC: 173 MG/DL
POTASSIUM SERPL-SCNC: 4.7 MMOL/L (ref 3.5–5.3)
PROT SERPL-MCNC: 7.5 G/DL (ref 6.4–8.2)
SODIUM SERPL-SCNC: 137 MMOL/L (ref 136–145)
TRIGL SERPL-MCNC: 119 MG/DL

## 2018-08-21 PROCEDURE — 73502 X-RAY EXAM HIP UNI 2-3 VIEWS: CPT

## 2018-08-21 PROCEDURE — 36415 COLL VENOUS BLD VENIPUNCTURE: CPT

## 2018-08-21 PROCEDURE — 80053 COMPREHEN METABOLIC PANEL: CPT

## 2018-08-21 PROCEDURE — 80061 LIPID PANEL: CPT

## 2018-08-21 PROCEDURE — 99213 OFFICE O/P EST LOW 20 MIN: CPT | Performed by: FAMILY MEDICINE

## 2018-08-21 PROCEDURE — 83036 HEMOGLOBIN GLYCOSYLATED A1C: CPT

## 2018-08-21 NOTE — PATIENT INSTRUCTIONS
Iliotibial Band Syndrome Exercises   AMBULATORY CARE:   Iliotibial band syndrome (ITBS) , also known as runner's knee, happens when your iliotibial band (ITB) becomes injured and causes pain  The ITB is a long band of tissue  It extends from the outside of your pelvis (hip bone) to the outside of your tibia (shin bone)  It helps keeps the knee in the correct position when you stand or move  ITBS occurs most often in long distance runners and cyclists  What you need to know about ITB exercises:  ITB exercises help strengthen the muscles around your knee and hip  Strong muscles can help reduce pain and decrease your risk of future injury  Contact your healthcare provider if:   · You have sharp pain during exercise or at rest     · You have questions or concerns about stretches or exercises  Decrease pain and swelling:   · Apply ice  on your hip, knee, or thigh for 15 to 20 minutes every hour or as directed  Use an ice pack, or put crushed ice in a plastic bag  Cover it with a towel  Ice helps prevent tissue damage and decreases swelling and pain  · Apply heat  on your hip, knee, or thigh for 20 to 30 minutes before you stretch or exercise  Use a heat pack or wet a washcloth and heat it for 15 seconds in the microwave  Heat helps decrease pain and makes it easier to stretch your muscles  · Massage painful areas as directed  Use a foam roller to gently massage your painful areas  Place the foam roller on a flat surface  Lie on your side with the foam roller against your painful leg  Move your body so that it rolls up and down from your hip to above your knee  Do not lie with it against the outside of your knee cap  Exercise safety:  Do not start an exercise program before you talk to your healthcare provider  You may need to wait until your swelling and pain have gone down before you start to exercise  · Move slowly and smoothly  Avoid fast or jerky motions  This will help prevent another injury  · Breathe normally  Do not hold your breath  It is important to breathe in and out so you do not tense up during exercise  Tension could prevent you from moving your joint in a full range of motion  · Do the exercises and stretches on both legs  Do this so both ITBs remain strong and flexible  · Stop if you feel sharp pain or an increase in pain  Stop the exercise and contact your healthcare provider if you have these symptoms  It is normal to feel some discomfort, such as a dull ache, during exercise  Regular exercise will help decrease your discomfort over time  · Warm up before you stretch and exercise  This will help prevent an injury  Walk or ride a stationary bike for 5 to 10 minutes  How to perform ITB stretches:  Always stretch before and after you do strengthening exercises  Hold each stretch for 30 seconds to 1 minute  Repeat each stretch 2 to 3 times or as directed  · Standing ITB stretch:  Stand with your injured leg behind your other leg  Cross your front leg over your injured leg  Bend sideways toward the hip that is not injured  Stop when you feel a stretch in the hip of your injured leg  Repeat on the other side  · Lying ITB stretch:  Lie on your back  Bend the knee of your injured leg toward your chest  Place your hand on the outside of your thigh  Slowly pull your knee across your body  Stop when you feel a stretch in your hip and outside of your thigh  Repeat on the other side  · Hip stretch:  Lie on the ground  Place both hands on the shin of 1 leg  Pull your knee toward your chest  Repeat on the other side  · Standing quadriceps stretch:  Stand and place one hand against a wall or hold the back of a chair for balance  With your weight on one leg, bend your other leg and grab your ankle  Pull your heel toward your buttocks  · Sitting hamstring stretch:  Sit with both legs straight in front of you   Place your palms on the floor and slide your hands forward until you feel the stretch  If possible, grab your toes  Do not round your back  How to perform ITB strengthening exercises: Your healthcare provider will tell you how often to do the following exercises:  · Standing half squats:  Stand with your feet shoulder-width apart  Lean your back against a wall or hold the back of a chair for balance, if needed  Slowly sit down about 10 inches, as if you are going to sit in a chair  Put most of your weight in your heels  Hold the squat for 5 seconds, then slowly rise to a standing position  Do 3 sets of 10 squats  · Sitting leg lifts:  Sit in a chair with both feet flat on the floor  Slowly straighten and raise one leg  Squeeze your thigh muscles and hold for 5 seconds  Relax and return your foot to the floor  Do 3 sets of 10 lifts on each leg  · Single leg dips:  Stand on your injured leg, between 2 chairs  The backs of the chairs should be toward you  Put 1 hand on each chair  Straighten your leg that is not injured and lift it off the floor  Use the chairs to hold some of your weight  Bend the knee of your injured leg  Slowly lower your body toward the floor a few inches  Your weight should be in your heel  Hold for 5 seconds  Slowly return to a standing position  Do 3 sets of 10 on each leg  · Standing hamstring curls: Face a wall and place both palms flat on the wall  Instead you can hold the back of a chair for balance  With your weight on 1 leg, lift your other foot as close to your buttocks as you can  Hold for 5 seconds and then lower your leg  Do 3 sets of 10 curls on each leg  · Hip adduction:  Lie on your injured side  Cross your top leg over your injured leg  Put your foot on the floor in front of you  Raise your injured leg until it touches the other leg  Slowly lower the leg to the floor  Do 3 sets of 10 on each leg  · Hip abduction:  Lie on your side that is not injured  Straighten your legs  Slowly raise your injured leg as high as you can  Keep your foot pointing straight  Hold for 5 seconds then slowly lower your leg  Do 3 sets of 10 on each leg  Follow up with your healthcare provider as directed:  Write down your questions so you remember to ask them during your visits  © 2017 [address] [employer] Information is for End User's use only and may not be sold, redistributed or otherwise used for commercial purposes  All illustrations and images included in CareNotes® are the copyrighted property of [employer] , [employer]  or [employer]  The above information is an  only  It is not intended as medical advice for individual conditions or treatments  Talk to your doctor, nurse or pharmacist before following any medical regimen to see if it is safe and effective for you  Hypothyroid

## 2018-08-21 NOTE — PROGRESS NOTES
Melody Isabel 1965 female MRN: [id number]    Acute Visit        ASSESSMENT/PLAN  Diagnoses and all orders for this visit:    Pain of left hip joint:  Based on exam likely iliotibial band strain from sleeping in different position  Check Xray for intraarticular pathology  Advised to take motrin 400 mg TID as needed for pain  Advised to take medication with food  Given iliotibial band strain exercises  · Apply ice  on your hip for 15 to 20 minutes every hour or as directed  Use an ice pack, or put crushed ice in a plastic bag  Cover it with a towel  Ice helps prevent tissue damage and decreases swelling and pain  · Apply heat  on your hip for 20 to 30 minutes before you stretch or exercise  Use a heat pack or wet a washcloth and heat it for 15 seconds in the microwave  Heat helps decrease pain and makes it easier to stretch your muscles  ·  Avoid fast or jerky motions  This will help prevent another injury  Given stretching exercises papers to do at home- standing ITB stretch,lying ITB stretch, hip stretch, standing quadriceps stretch,Sitting hamstring stretch, Standing half squats, Sitting leg liftsSingle leg dips, Standing hamstring curls  Hip adduction  Will call patient about XR results and advise to do exercises after that  -     XR hip/pelv 2-3 vws left if performed; Future        FU in 2 weeks if symptoms not resolved  Future Appointments  Date Time Provider Yandy Laguerre   9/17/2018 8:30 AM Carolina Matos PA-C [facility]   10/4/2018 10:00 AM [facility]  [facility]   12/3/2018 8:30 AM Anali Banegas DO [facility]        SUBJECTIVE  CC:Left hip pain  Melody Isabel is a 52 y o  female who presented for an acute visit complaining of left hip pain  Hip Pain    Incident onset: 2 months  There was no injury mechanism  The pain is present in the left hip (on lateral side of hip)  The quality of the pain is described as aching   The pain is at a severity of 7/10  The pain is moderate  Pain course: on movement  Pertinent negatives include no inability to bear weight, loss of motion, loss of sensation, muscle weakness, numbness or tingling  Associated symptoms comments: Pain worse with movement and prolonged sitting    She has tried NSAIDs for the symptoms  The treatment provided moderate relief  Reports that she thinks that she sleep in certain position, that caused left hip pain on lateral side  Review of Systems   Constitutional: Negative for activity change, fatigue and fever  Musculoskeletal: Positive for arthralgias  Negative for gait problem and joint swelling  Left hip pain  Skin: Negative for color change and rash  Neurological: Negative for dizziness, tingling, weakness, numbness and headaches  Psychiatric/Behavioral: Negative for dysphoric mood  Historical Information   The patient history was reviewed as follows:  Past Medical History:   Diagnosis Date    GERD (gastroesophageal reflux disease)     Hypertension     Restless legs      Past Surgical History:   Procedure Laterality Date    COLPOPEXY VAGINAL EXTRAPERITONEAL (VEC) WITH INSERTION PUBOVAGINAL SLING      NASAL SEPTUM SURGERY      KY COLONOSCOPY FLX DX W/COLLJ SPEC WHEN PFRMD N/A 6/14/2017    Procedure: COLONOSCOPY;  Surgeon: Mian Valenzuela MD;  Location: [facility]; Service: Gastroenterology    TONSILLECTOMY      TUBAL LIGATION       Family History   Problem Relation Age of Onset    Stroke Mother         Cerebrovascular accident (CVA)    Depression Father         Major depressive disorder, recurrent episode, severe    Depression Sister         Major depressive disorder, recurrent episode, severe    Depression Paternal Aunt         Major depressive disorder, recurrent episode, severe      Social History   History   Alcohol Use No     Comment: Pt denies any h/o ETOH or illicit drug use or abuse        History   Drug Use No     Comment: Pt denies any h/o ETOH or illicit drug use or abuse  History   Smoking Status    Never Smoker   Smokeless Tobacco    Never Used       Medications:   Meds/Allergies   Current Outpatient Prescriptions   Medication Sig Dispense Refill    benazepril-hydrochlorthiazide (LOTENSIN HCT) 10-12 5 MG per tablet Take 1 tablet by mouth daily 90 tablet 3    DULoxetine (CYMBALTA) 60 mg delayed release capsule Take 1 capsule (60 mg total) by mouth daily for 90 days 90 capsule 0    Estradiol (VAGIFEM) 10 MCG TABS Insert 1 tablet (10 mcg total) into the vagina 2 (two) times a week (Patient not taking: Reported on 8/21/2018 ) 8 tablet 2    hydrOXYzine HCL (ATARAX) 25 mg tablet Take 1 tab by mouth daily to twice daily as needed for anxiety or insomnia (Patient not taking: Reported on 8/21/2018 ) 180 tablet 0     No current facility-administered medications for this visit  No Known Allergies    OBJECTIVE  Vitals:   Vitals:    08/21/18 1656   BP: 126/80   Pulse: 60   Resp: 16   Temp: (!) 96 7 °F (35 9 °C)   Weight: 62 1 kg (136 lb 12 8 oz)   Height: 5' 2 2" (1 58 m)       Invasive Devices          No matching active lines, drains, or airways          Physical Exam   Constitutional: She is oriented to person, place, and time  She appears well-developed and well-nourished  HENT:   Head: Normocephalic  Eyes: Conjunctivae are normal    Neck: Neck supple  Cardiovascular: Normal rate, regular rhythm, normal heart sounds and intact distal pulses  Pulmonary/Chest: Effort normal and breath sounds normal    Abdominal: Soft  Bowel sounds are normal    Musculoskeletal: Normal range of motion  Left hip: She exhibits tenderness and bony tenderness  She exhibits normal range of motion, normal strength, no swelling and no deformity  Antalgic gait on left side,Tenderness on palpation on lateral side of hip  Full ROM at hip, pain in hip on adduction, internal rotation  FABERS positive, FADIR negative     Neurological: She is alert and oriented to person, place, and time  Skin: Skin is warm  Psychiatric: She has a normal mood and affect   Her behavior is normal             _____________________________________________________________________     Keyona Corbett MD, PGY-2  [facility]   8/21/2018

## 2018-08-26 DIAGNOSIS — M70.62 TROCHANTERIC BURSITIS OF LEFT HIP: Primary | ICD-10-CM

## 2018-09-04 ENCOUNTER — OFFICE VISIT (OUTPATIENT)
Dept: OBGYN CLINIC | Facility: OTHER | Age: 53
End: 2018-09-04
Payer: COMMERCIAL

## 2018-09-04 VITALS
BODY MASS INDEX: 24.45 KG/M2 | DIASTOLIC BLOOD PRESSURE: 70 MMHG | WEIGHT: 138 LBS | HEART RATE: 80 BPM | HEIGHT: 63 IN | SYSTOLIC BLOOD PRESSURE: 105 MMHG

## 2018-09-04 DIAGNOSIS — M70.62 GREATER TROCHANTERIC BURSITIS OF LEFT HIP: ICD-10-CM

## 2018-09-04 DIAGNOSIS — M25.552 PAIN IN LEFT HIP: Primary | ICD-10-CM

## 2018-09-04 PROCEDURE — 99203 OFFICE O/P NEW LOW 30 MIN: CPT | Performed by: INTERNAL MEDICINE

## 2018-09-04 RX ORDER — PREDNISONE 20 MG/1
20 TABLET ORAL DAILY
Qty: 6 TABLET | Refills: 0 | Status: SHIPPED | OUTPATIENT
Start: 2018-09-04 | End: 2020-03-03

## 2018-09-04 NOTE — PROGRESS NOTES
Assessment/Plan:  Assessment/Plan   Diagnoses and all orders for this visit:    Pain in left hip  -     Ambulatory referral to Physical Therapy; Future    Greater trochanteric bursitis of left hip  -     Ambulatory referral to Physical Therapy; Future  -     predniSONE 20 mg tablet; Take 1 tablet (20 mg total) by mouth daily x5 days then 1/2 tab on day 6 & 7         Mrs Beau Hernandez is physical examination today is only significant for mild greater trochanteric discomfort  However, she does exhibit a positive FADIR test suspicious for a femoral acetabular impingement  Given that her physical examination today  Does not  Reproduce significant pain, I have recommended conservative management with physical therapy rehabilitation plus home exercise program as well as prednisone x1 week  She will only take the NSAID for another 2 days  If her pain worsens while she is doing physical therapy rehabilitation she will notify our office and will bring her back for re-evaluation  Also, if her pain does not remarkably improved over subside after about 6-8 weeks, she will notify our office as well  Subjective:   Patient ID: Evangelista Knox is a 52 y o  female  HPI    Ms Beau Hernandez is a 75-year-old female who presents for initial evaluation of an acute onset left hip pain that started approximately 5-6 weeks ago  There was no incident prior to onset of the pain  Her pain is often mechanical in nature  It is mostly aggravated when she is going from sitting to standing  The pain occasionally wakes her up at night  She has been   Under the care of her PMD who referred her to my service for further evaluation and management  On today's presentation, she reports that she has been taking approximately 1000 mg of ibuprofen for the past 5 days  It has reportedly offered some improvement of the pain  She does not have any numbness or tingling      The following portions of the patient's history were reviewed and updated as appropriate: allergies, current medications, past family history, past medical history, past social history, past surgical history and problem list     Review of Systems  Review of Systems   Constitutional: Negative  HENT: Negative  Eyes: Negative  Respiratory: Negative  Cardiovascular: Negative  Musculoskeletal:        As per history of present illness   Skin: Negative  Neurological:   Negative   Psychiatric/Behavioral: Negative  Objective:  Vitals:    09/04/18 0912   BP: 105/70   Pulse: 80   Weight: 62 6 kg (138 lb)   Height: 5' 3" (1 6 m)       Left Hip Exam     Tenderness   The patient is experiencing tenderness in the greater trochanter  Range of Motion   The patient has normal left hip ROM  Muscle Strength   The patient has normal left hip strength  Other   Sensation: normal  Pulse: present    Comments:  +FADIR test      Back Exam     Tenderness   The patient is experiencing no tenderness  Range of Motion   The patient has normal back ROM  Muscle Strength   The patient has normal back strength  Physical Exam  Constitutional: Oriented to person, place, and time  Well-developed and well-nourished  HENT:   Head: Normocephalic and atraumatic  Eyes: Conjunctivae are normal    Cardiovascular: Normal rate  Pulmonary/Chest: Effort normal    Neurological: Alert and oriented to person, place, and time  Skin: Skin is warm and dry  Psychiatric: Normal mood and affect  I have personally reviewed pertinent films in PACS and my interpretation is Left hip x-ray done on 8/21/2018 is significant for a mild calcification at the greater trochanteric bursa area best seen on image 2  There is very minimal accentuation of the superior lateral femoral head best seen on image 1  Otherwise, no femoral acetabular joint degenerative changes noted  Boris Lee

## 2018-09-12 ENCOUNTER — EVALUATION (OUTPATIENT)
Dept: PHYSICAL THERAPY | Facility: OTHER | Age: 53
End: 2018-09-12
Payer: COMMERCIAL

## 2018-09-12 DIAGNOSIS — M70.62 GREATER TROCHANTERIC BURSITIS OF LEFT HIP: ICD-10-CM

## 2018-09-12 DIAGNOSIS — M25.552 PAIN IN LEFT HIP: ICD-10-CM

## 2018-09-12 PROCEDURE — 97140 MANUAL THERAPY 1/> REGIONS: CPT | Performed by: PHYSICAL THERAPIST

## 2018-09-12 PROCEDURE — G8978 MOBILITY CURRENT STATUS: HCPCS | Performed by: PHYSICAL THERAPIST

## 2018-09-12 PROCEDURE — 97112 NEUROMUSCULAR REEDUCATION: CPT | Performed by: PHYSICAL THERAPIST

## 2018-09-12 PROCEDURE — G8979 MOBILITY GOAL STATUS: HCPCS | Performed by: PHYSICAL THERAPIST

## 2018-09-12 PROCEDURE — 97110 THERAPEUTIC EXERCISES: CPT | Performed by: PHYSICAL THERAPIST

## 2018-09-12 PROCEDURE — 97161 PT EVAL LOW COMPLEX 20 MIN: CPT

## 2018-09-12 NOTE — PROGRESS NOTES
PT Evaluation     Today's date: 2018  Patient name: Candice Maya  : 1965  MRN: [id number]  Referring provider: Ray Teague MD  Dx:   Encounter Diagnosis     ICD-10-CM    1  Pain in left hip M25 552 Ambulatory referral to Physical Therapy   2  Greater trochanteric bursitis of left hip M70 62 Ambulatory referral to Physical Therapy       Start Time: 300  Stop Time: 415  Total time in clinic (min): 75 minutes   1 on 1: Entire Duration    Assessment  Impairments: abnormal or restricted ROM, activity intolerance, impaired physical strength and lacks appropriate home exercise program    Assessment details: Patient presents to physical therapy with complaints of Left hip pain disrupting her ability to sleep and work  However, upon arrival of appointment, patient reported she had taken Ibuprofen and Prednisone and was not in pain at the time  She presents with decreased activity tolerance, muscle strength and range of motion associated with her current condition  The patient will benefit from skilled physical therapy with a focus on therapeutic exercise, manual therapy, neuromuscular re-education and patient education including a home exercise program to address noted deficits to progress toward patient goals of increasing her flexibility and be able to sleep without disturbances from hip pain  Will re-assess patient progress in 4 weeks  PT performed muscle energy technique for functional leg length discrepency and foam rolling to L ITBand  Patient responded favorably to treatment  Patient reported no pain upon conclusion of session and reported "I feel much looser"  Barriers to therapy: Depression, High Blood Pressure  Understanding of Dx/Px/POC: good   Prognosis: good    Goals  Short Term Goals:  1  Patient will increase strength by 1/2 grade within 4 weeks  2  Patient will demonstrate increase in 10 degrees pain-free range of motion by 4 weeks     3  Patient will report a decrease in pain by 2 subjective ratings in 4 weeks  Long Term Goals:  1  Patient will demonstrate improved FOTO score  2  Patient will be independent in ADLs/IADLs  3  Patient will be independent in home exercise program upon discharge  Plan  Planned modality interventions: unattended electrical stimulation and low level laser therapy  Planned therapy interventions: abdominal trunk stabilization, joint mobilization, manual therapy, flexibility, therapeutic exercise, stretching, strengthening, patient education, neuromuscular re-education and home exercise program  Frequency: 2-3x/week  Duration in weeks: 12  Plan of Care beginning date: 2018  Plan of Care expiration date: 2018  Treatment plan discussed with: patient        Subjective Evaluation    History of Present Illness  Date of onset: 2018  Mechanism of injury: Patient explained she first noticed it when she got up in the morning but it has been gradually increasing  She started noticing it when she sleeps and when she sits for prolonged periods of time  She explained that the pain goes away the more she moves  It has been gradually getting worse until she got on Prednisone  She went to her Primary care doctor then sports  Medicine doctor who prescribed her Prednisone which has been helping  She reports no pain at start of therapy session, which she contributes to the medication  Quality of life: good    Pain  Current pain ratin (on Prednisone and ibuprofen this morning)  At best pain ratin  At worst pain ratin  Location: L Hip  Quality: sharp and radiating (constant sharp ache)  Relieving factors: change in position and medications  Aggravating factors: sitting and standing (sleeping is the worst)  Progression: worsening      Diagnostic Tests  Abnormal x-ray: Doctor mentioned infllammation and possible bursitis and/or impingement    Patient Goals  Patient goals for therapy: return to sport/leisure activities, increased strength and decreased pain  Patient goal: Patient would like more flexibility and be able to sleep through the night without pain in her hip waking her up        Objective     Palpation   Left   Tenderness of the lumbar paraspinals and TFL  Trigger point to gluteus medius  Tenderness     Left Hip   Tenderness in the PSIS, greater trochanter and sacroiliac joint  Lumbar Screen  Lumbar range of motion within normal limits with the following exceptions:L S/B: Slight p! In L lower back at end range     Neurological Testing     Sensation     Hip   Left Hip   Intact: light touch    Right Hip   Intact: light touch    Reflexes   Left   Patellar (L4): normal (2+)  Achilles (S1): normal (2+)    Right   Patellar (L4): normal (2+)  Achilles (S1): normal (2+)    Active Range of Motion   Left Hip   Normal active range of motion    Right Hip   Normal active range of motion    Passive Range of Motion   Left Hip   Normal passive range of motion    Right Hip   Normal passive range of motion    Joint Play   Left Hip   Joints within functional limits are the anterior hip capsule and lateral hip capsule  Comments  Left posterior hip capsule comments: slight p! at end range  Strength/Myotome Testing     Left Hip   Planes of Motion   Flexion: 4+  Extension: 3+  Abduction: 4-  External rotation: 4  Internal rotation: 4    Right Hip   Planes of Motion   Flexion: 5  Extension: 4-  Abduction: 4-  External rotation: 4+  Internal rotation: 4+    Tests     Left Hip   Positive CAMERON, J sign, long sit and Daniel  Negative FADIR, scour and SI compression  Joe: Positive  SLR: Negative  Additional Tests Details  + Supine to Sit test: R=L supine, R>L sitting  L: + Thigh Thrust recreation of p!       Flowsheet Rows      Most Recent Value   PT/OT G-Codes   Current Score  47   Projected Score  64          Precautions: High Blood pressure, Depression    Daily Treatment Diary     Manual              Foam Rolling             Muscle energy Joint Mobs                                           Exercise Diary              Iso TA             SLR x4                                                                                                                                                                                                                                                           Modalities

## 2018-09-17 ENCOUNTER — OFFICE VISIT (OUTPATIENT)
Dept: PHYSICAL THERAPY | Facility: OTHER | Age: 53
End: 2018-09-17
Payer: COMMERCIAL

## 2018-09-17 ENCOUNTER — OFFICE VISIT (OUTPATIENT)
Dept: PSYCHIATRY | Facility: CLINIC | Age: 53
End: 2018-09-17
Payer: COMMERCIAL

## 2018-09-17 VITALS — WEIGHT: 135.4 LBS | BODY MASS INDEX: 24.92 KG/M2 | HEIGHT: 62 IN

## 2018-09-17 DIAGNOSIS — M70.62 TROCHANTERIC BURSITIS OF LEFT HIP: ICD-10-CM

## 2018-09-17 DIAGNOSIS — F33.1 RECURRENT MAJOR DEPRESSIVE EPISODES, MODERATE (HCC): ICD-10-CM

## 2018-09-17 DIAGNOSIS — F41.1 ANXIETY STATE: Primary | ICD-10-CM

## 2018-09-17 DIAGNOSIS — M70.62 GREATER TROCHANTERIC BURSITIS OF LEFT HIP: ICD-10-CM

## 2018-09-17 DIAGNOSIS — M25.552 PAIN IN LEFT HIP: Primary | ICD-10-CM

## 2018-09-17 PROCEDURE — 99213 OFFICE O/P EST LOW 20 MIN: CPT | Performed by: PHYSICIAN ASSISTANT

## 2018-09-17 PROCEDURE — 97112 NEUROMUSCULAR REEDUCATION: CPT

## 2018-09-17 PROCEDURE — 97110 THERAPEUTIC EXERCISES: CPT

## 2018-09-17 PROCEDURE — 97140 MANUAL THERAPY 1/> REGIONS: CPT

## 2018-09-17 RX ORDER — DULOXETIN HYDROCHLORIDE 60 MG/1
60 CAPSULE, DELAYED RELEASE ORAL DAILY
Qty: 90 CAPSULE | Refills: 0 | Status: SHIPPED | OUTPATIENT
Start: 2018-09-17 | End: 2018-12-21 | Stop reason: SDUPTHER

## 2018-09-17 NOTE — PROGRESS NOTES
Daily Note     Today's date: 2018  Patient name: Ector Logan  : 1965  MRN: [id number]  Referring provider: Kibry Leija MD  Dx:   Encounter Diagnosis     ICD-10-CM    1  Pain in left hip M25 552    2  Greater trochanteric bursitis of left hip M70 62            1 on 1 entire session with PTA       Subjective: Patient reports most discomfort in her L hip when she sits for a prolonged period of time and when she wakes up  Pain is "in the joint" on the "side of the hip"  She experienced minimal soreness after last session  She reported feeling better when she v  When she first walked in       Objective: See treatment diary below  Precautions: High Blood pressure, Depression    Daily Treatment Diary     Manual              Foam Rolling MP            Muscle energy             Joint Mobs                                           Exercise Diary              Iso TA HEP            SLR x4 HEP            Alter G M 75% fwd walk 10' 3 6 mph    Mini lunges 10 ea  SLS 30"x2 b/l            Standing hip x3 OTB 3"1 x10            Side stepping OTB @knees 2'            ecc Bridges 10x                                                                                                                                                                                                      Modalities                                                         Assessment: Tolerated treatment well  Patient tolerated beginning portion of session in the [address] [place] [address],  [facility] well, therefore performed standing hip x 3 and side stepping on land  Patient demonstrated fatigue post treatment, exhibited good technique with therapeutic exercises and would benefit from continued PT      Plan: Continue per plan of care  Progress treatment as tolerated  SPT CHIDI advised to focus on glut and core strengthening
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
## 2018-09-19 ENCOUNTER — OFFICE VISIT (OUTPATIENT)
Dept: PHYSICAL THERAPY | Facility: OTHER | Age: 53
End: 2018-09-19
Payer: COMMERCIAL

## 2018-09-19 DIAGNOSIS — M25.552 PAIN IN LEFT HIP: Primary | ICD-10-CM

## 2018-09-19 DIAGNOSIS — M70.62 GREATER TROCHANTERIC BURSITIS OF LEFT HIP: ICD-10-CM

## 2018-09-19 PROCEDURE — 97112 NEUROMUSCULAR REEDUCATION: CPT

## 2018-09-19 PROCEDURE — 97140 MANUAL THERAPY 1/> REGIONS: CPT

## 2018-09-19 PROCEDURE — 97110 THERAPEUTIC EXERCISES: CPT

## 2018-09-19 PROCEDURE — 97116 GAIT TRAINING THERAPY: CPT

## 2018-09-19 NOTE — PROGRESS NOTES
Daily Note     Today's date: 2018  Patient name: Melody Isabel  : 1965  MRN: [id number]  Referring provider: Jeanne Watkins MD  Dx:   Encounter Diagnosis     ICD-10-CM    1  Pain in left hip M25 552    2  Greater trochanteric bursitis of left hip M70 62            1 on 1 entire session with PTA       Subjective: Patient reports muscle soreness after last session but no hip soreness  Hip soreness when waking up 5/10 today but dissipated within the hour  Relief (0/10 pain) with movement and Mortin  Objective: See treatment diary below  Precautions: High Blood pressure, Depression    Daily Treatment Diary     Manual             Foam Rolling MP MP           Muscle energy             Joint Mobs                                           Exercise Diary             Iso TA HEP            SLR x4 HEP            Alter G M 75% fwd walk 10' 3 6 mph    Mini lunges 10 ea  SLS 30"x2 b/l M 75%      Mini lunges alt 10 ea  SLS 30"x2           Standing hip x3 OTB 3"1 x10 OTB 3" 2x10           Side stepping OTB @knees 2' OTB @ knees 2'           ecc Bridges 10x 2x10                                                                                                                                                                                                     Modalities                                                         Assessment: Tolerated treatment well  Progressed patient with max fatigue  Patient demonstrated fatigue post treatment, exhibited good technique with therapeutic exercises and would benefit from continued PT      Plan: Continue per plan of care  Progress treatment as tolerated  SPT CHIDI advised to focus on glut and core strengthening

## 2018-09-24 ENCOUNTER — OFFICE VISIT (OUTPATIENT)
Dept: PHYSICAL THERAPY | Facility: OTHER | Age: 53
End: 2018-09-24
Payer: COMMERCIAL

## 2018-09-24 DIAGNOSIS — M25.552 PAIN IN LEFT HIP: Primary | ICD-10-CM

## 2018-09-24 DIAGNOSIS — M70.62 GREATER TROCHANTERIC BURSITIS OF LEFT HIP: ICD-10-CM

## 2018-09-24 PROCEDURE — 97110 THERAPEUTIC EXERCISES: CPT

## 2018-09-24 PROCEDURE — 97116 GAIT TRAINING THERAPY: CPT

## 2018-09-24 PROCEDURE — 97112 NEUROMUSCULAR REEDUCATION: CPT

## 2018-09-24 NOTE — PROGRESS NOTES
Daily Note     Today's date: 2018  Patient name: Cleve Mcmahon  : 1965  MRN: [id number]  Referring provider: Huan Vazquez MD  Dx:   Encounter Diagnosis     ICD-10-CM    1  Pain in left hip M25 552    2  Greater trochanteric bursitis of left hip M70 62            1 on 1 entire session with PTA        Subjective: Patient states that her hip is slowly feeling better  Objective: See treatment diary below  Precautions: High Blood pressure, Depression    Daily Treatment Diary     Manual             Foam Rolling MP MP           Muscle energy             Joint Mobs                                           Exercise Diary           Iso TA HEP  HEP         SLR x4 HEP  HEP         Alter G M 75% fwd walk 10' 3 6 mph    Mini lunges 10 ea  SLS 30"x2 b/l M 75%      Mini lunges alt 10 ea  SLS 30"x2 M 75% mini lunges alt 10 ea    SLS 30"x2         Standing hip x3 OTB 3"1 x10 OTB 3" 2x10 OTB  3"  3x10 ea         Side stepping OTB @knees 2' OTB @ knees 2' OTB  @ knees  2'         ecc Bridges 10x 2x10 2x10                                                                                                                                                                                     Modalities                                                         Assessment: Patient did well with TE as listed VCs for glute activation  Plan: Continue per plan of care  Progress treatment as tolerated  SPT CHIDI advised to focus on glut and core strengthening

## 2018-09-26 ENCOUNTER — OFFICE VISIT (OUTPATIENT)
Dept: PHYSICAL THERAPY | Facility: OTHER | Age: 53
End: 2018-09-26
Payer: COMMERCIAL

## 2018-09-26 DIAGNOSIS — M25.552 PAIN IN LEFT HIP: Primary | ICD-10-CM

## 2018-09-26 DIAGNOSIS — M70.62 GREATER TROCHANTERIC BURSITIS OF LEFT HIP: ICD-10-CM

## 2018-09-26 PROCEDURE — 97116 GAIT TRAINING THERAPY: CPT

## 2018-09-26 PROCEDURE — 97112 NEUROMUSCULAR REEDUCATION: CPT

## 2018-09-26 PROCEDURE — 97140 MANUAL THERAPY 1/> REGIONS: CPT

## 2018-09-26 PROCEDURE — 97530 THERAPEUTIC ACTIVITIES: CPT

## 2018-09-26 PROCEDURE — 97110 THERAPEUTIC EXERCISES: CPT

## 2018-09-26 NOTE — PROGRESS NOTES
Daily Note     Today's date: 2018  Patient name: Evangelista Knox  : 1965  MRN: [id number]  Referring provider: Salina Pascual MD  Dx:   Encounter Diagnosis     ICD-10-CM    1  Pain in left hip M25 552    2  Greater trochanteric bursitis of left hip M70 62            1 on 1 entire session with 2 PTAs        Subjective: Patient states she has not had to take Pike in 3 days  Denies pain today  Objective: See treatment diary below  Precautions: High Blood pressure, Depression    Daily Treatment Diary     Manual            Foam Rolling MP MP MP          Muscle energy             Joint Mobs                                           Exercise Diary          Iso TA HEP  HEP         SLR x4 HEP  HEP         Alter G M 75% fwd walk 10' 3 6 mph    Mini lunges 10 ea  SLS 30"x2 b/l M 75%      Mini lunges alt 10 ea  SLS 30"x2 M 75% mini lunges alt 10 ea    SLS 30"x2 80% 10' fwd walking         Standing hip x3 OTB 3"1 x10 OTB 3" 2x10 OTB  3"  3x10 ea Storks 10 ea  b/l        Side stepping OTB @knees 2' OTB @ knees 2' OTB  @ knees  2' GTB x3        ecc Bridges 10x 2x10 2x10 2x10                                                                                                                                                                                    Modalities                                                         Assessment: Progressed patient as charted  Patient did not have pain with Luzma only muscle fatigue  Plan: Continue per plan of care  Progress treatment as tolerated  Patient feels she could be done with PT soon, discuss with PT NV

## 2018-09-27 ENCOUNTER — APPOINTMENT (OUTPATIENT)
Dept: PHYSICAL THERAPY | Facility: OTHER | Age: 53
End: 2018-09-27
Payer: COMMERCIAL

## 2018-10-01 ENCOUNTER — OFFICE VISIT (OUTPATIENT)
Dept: PHYSICAL THERAPY | Facility: OTHER | Age: 53
End: 2018-10-01
Payer: COMMERCIAL

## 2018-10-01 DIAGNOSIS — M70.62 GREATER TROCHANTERIC BURSITIS OF LEFT HIP: ICD-10-CM

## 2018-10-01 DIAGNOSIS — M25.552 PAIN IN LEFT HIP: Primary | ICD-10-CM

## 2018-10-01 PROCEDURE — 97140 MANUAL THERAPY 1/> REGIONS: CPT | Performed by: PHYSICAL THERAPIST

## 2018-10-01 PROCEDURE — 97110 THERAPEUTIC EXERCISES: CPT | Performed by: PHYSICAL THERAPIST

## 2018-10-01 PROCEDURE — G8979 MOBILITY GOAL STATUS: HCPCS

## 2018-10-01 PROCEDURE — G8980 MOBILITY D/C STATUS: HCPCS

## 2018-10-01 PROCEDURE — G8978 MOBILITY CURRENT STATUS: HCPCS | Performed by: PHYSICAL THERAPIST

## 2018-10-01 NOTE — PROGRESS NOTES
Daily Note     Today's date: 10/1/2018  Patient name: Amber Huerta  : 1965  MRN: [id number]  Referring provider: Billie Calderón MD  Dx:   Encounter Diagnosis     ICD-10-CM    1  Pain in left hip M25 552    2  Greater trochanteric bursitis of left hip M70 62        Start Time: 0815  Stop Time: 915  Total time in clinic (min): 60 minutes  1 on 1: Entire Duration     Subjective: Patient denies pain upon entry of appointment and states "I've been pain free all weekend" and hasn't taken Ibuprofen or pain medication all weekend  Objective: See treatment diary below    Daily Treatment Diary     Manual            Foam Rolling MP MP MP          Muscle energy             Joint Mobs                                           Exercise Diary  9/17 9/19 9/24 9/26 10/1       Curve     8'       Bike     10'       Iso TA HEP  HEP         SLR x4 HEP  HEP  2x10 ea, HEP       Alter G M 75% fwd walk 10' 3 6 mph    Mini lunges 10 ea  SLS 30"x2 b/l M 75%      Mini lunges alt 10 ea  SLS 30"x2 M 75% mini lunges alt 10 ea    SLS 30"x2 80% 10' fwd walking         Standing hip x3 OTB 3"1 x10 OTB 3" 2x10 OTB  3"  3x10 ea Storks 10 ea  b/l GTB 4 way, 15x ea       Side stepping OTB @knees 2' OTB @ knees 2' OTB  @ knees  2' GTB x3 GTB x3 laps       ecc Bridges 10x 2x10 2x10 2x10 2x15 w/ add       Chair squats     2x10, HEP                                                                                                                                                                       Modalities                                            Assessment: Tolerated treatment well  PT spoke with patient regarding discharge, pt agreed and performed re-evaluation for discharge  PT reviewed HEP and pain control PRN with patient and pt demonstrated understanding  Patient exhibited good technique with therapeutic exercises      Plan: PT to discharge patient this visit, PT spoke with pt regarding gym membership/fitness program and PT information for follow-up or future need of services

## 2018-10-01 NOTE — PROGRESS NOTES
PT Re-Evaluation  and PT Discharge    Today's date: 10/1/2018  Patient name: Ángel Kay  : 1965  MRN: [id number]  Referring provider: Bret Vogt MD  Dx:   Encounter Diagnosis     ICD-10-CM    1  Pain in left hip M25 552    2  Greater trochanteric bursitis of left hip M70 62        Start Time: 815  Stop Time: 915  Total time in clinic (min): 60 minutes   1 : Entire Duration    Assessment    Assessment details: Patient presents to physical therapy with complaints of Left hip pain disrupting her ability to sleep and work  Patient tolerated today's treatment well  PT spoke with patient regarding discharge, pt agreed and performed re-evaluation for discharge  PT discontinued manual therapy due to patient improvements  Pt demonstrated improved strength and flexibility with re-evaluation testing  Patient showed strength, ROM and functional improvements  PT reviewed HEP and pain control PRN with patient and pt demonstrated understanding  Patient exhibited good technique with therapeutic exercises and independence in performance of exercises  PT to discharge patient this visit, PT spoke with pt regarding gym membership/fitness program and PT information for follow-up or future need of services  Barriers to therapy: Depression, High Blood Pressure  Understanding of Dx/Px/POC: good   Prognosis: good    Goals  Short Term Goals:  1  Patient will increase strength by 1/2 grade within 4 weeks- MET  2  Patient will demonstrate increase in 10 degrees pain-free range of motion by 4 weeks  - MET  3  Patient will report a decrease in pain by 2 subjective ratings in 4 weeks  - MET  Long Term Goals:  1  Patient will demonstrate improved FOTO score  -MET  2  Patient will be independent in ADLs/IADLs  - MET  3  Patient will be independent in home exercise program upon discharge   - MET    Plan  Planned therapy interventions: therapeutic exercise, home exercise program, stretching, neuromuscular re-education and strengthening  Treatment plan discussed with: patient  Plan details: Patient discharged 10/1/2018 and provided an HEP, pt demonstrated understanding  Subjective Evaluation    History of Present Illness  Date of onset: 2018  Mechanism of injury: Patient originally presented to physical therapy with pain in her L hip  Patient denies pain today and states "I've been pain free all weekend" and hasn't taken Ibuprofen or pain medication all weekend  Patient reports feeling 100% resolved in regards to pain, 80% for strength, and 90% for flexibility  She denies having pain with any activity and reports no need for pain medication  She reports getting a twinge every once in a while but has improved significantly and resolves when she changes position  Quality of life: good    Pain  Current pain ratin  At best pain ratin  At worst pain ratin  Quality: constant sharp ache  Relieving factors: medications (PRN )  Exacerbated by: sleeping - twinge once in awhile if on L side  Progression: improved      Diagnostic Tests  Abnormal x-ray: Doctor mentioned infllammation and possible bursitis and/or impingement  Objective     Palpation   Left   Tenderness of the TFL  Lumbar Screen  Lumbar range of motion within normal limits with the following exceptions:L S/B: Slight p!  In L lower back at end range     Neurological Testing     Sensation     Hip   Left Hip   Intact: light touch    Right Hip   Intact: light touch    Reflexes   Left   Patellar (L4): normal (2+)  Achilles (S1): normal (2+)    Right   Patellar (L4): normal (2+)  Achilles (S1): normal (2+)    Active Range of Motion   Left Hip   Normal active range of motion    Right Hip   Normal active range of motion    Passive Range of Motion   Left Hip   Normal passive range of motion    Right Hip   Normal passive range of motion    Joint Play   Left Hip   Joints within functional limits are the posterior hip capsule, anterior hip capsule and lateral hip capsule  Strength/Myotome Testing     Left Hip   Planes of Motion   Flexion: 4+  Extension: 4+  Abduction: 4+  External rotation: 4  Internal rotation: 4    Right Hip   Planes of Motion   Flexion: 5  Extension: 4+  Abduction: 4+  External rotation: 4+  Internal rotation: 4+    Left Knee   Flexion: 4  Extension: 5    Right Knee   Flexion: 4+  Extension: 5    Left Ankle/Foot   Dorsiflexion: 5  Plantar flexion: 4+  Inversion: 5  Eversion: 5    Right Ankle/Foot   Dorsiflexion: 5  Plantar flexion: 4+  Inversion: 5  Eversion: 5    Tests     Left Hip   Positive Ely's, J sign and Daniel  Negative FADIR, scour and SI compression  Joe: Positive  SLR: Negative  Additional Tests Details  Supine to Sit test: Negative for LLD  L Thigh Thrust: Negative       Flowsheet Rows      Most Recent Value   PT/OT G-Codes   Current Score  79   Assessment Type  Discharge   G code set  Mobility: Walking & Moving Around   Mobility: Walking and Moving Around Goal Status ()  CJ   Mobility: Walking and Moving Around Discharge Status ()  CJ          Precautions: High Blood pressure, Depression    Daily Treatment Diary     Manual  9/17 9/19 9/26          Foam Rolling MP MP MP          Muscle energy             Joint Mobs                                           Exercise Diary  9/17 9/19 9/24 9/26 10/1                   Bike     10'       Curve     8'       Iso TA HEP  HEP         SLR x4 HEP  HEP  2x10 ea, HEP       Alter G M 75% fwd walk 10' 3 6 mph    Mini lunges 10 ea  SLS 30"x2 b/l M 75%      Mini lunges alt 10 ea       SLS 30"x2 M 75% mini lunges alt 10 ea    SLS 30"x2 80% 10' fwd walking         Standing hip x3 OTB 3"1 x10 OTB 3" 2x10 OTB  3"  3x10 ea Storks 10 ea  b/l GTB 15x ea B/L, HEP       Side stepping OTB @knees 2' OTB @ knees 2' OTB  @ knees  2' GTB x3 GTB x3laps       ecc Bridges 10x 2x10 2x10 2x10 2x15, w/ Add       Marilou Squat     2x10, HEP       DKTC stretch     10x10", HEP Supine HS str     HEP       Prone Quad Str     HEP                                                                                                                                   Modalities

## 2018-10-03 ENCOUNTER — TELEPHONE (OUTPATIENT)
Dept: FAMILY MEDICINE CLINIC | Facility: CLINIC | Age: 53
End: 2018-10-03

## 2018-10-03 DIAGNOSIS — Z12.39 BREAST SCREENING: Primary | ICD-10-CM

## 2018-10-03 NOTE — TELEPHONE ENCOUNTER
Dr Tj Yuen,  Patient has a MammoGram appt for tomorrow at [facility] and forgot to request an order    Patirent has a 10:00am appt

## 2018-10-04 ENCOUNTER — HOSPITAL ENCOUNTER (OUTPATIENT)
Dept: MAMMOGRAPHY | Facility: HOSPITAL | Age: 53
Discharge: HOME | End: 2018-10-04
Payer: COMMERCIAL

## 2018-10-04 ENCOUNTER — APPOINTMENT (OUTPATIENT)
Dept: PHYSICAL THERAPY | Facility: OTHER | Age: 53
End: 2018-10-04
Payer: COMMERCIAL

## 2018-10-04 DIAGNOSIS — Z12.39 SCREENING BREAST EXAMINATION: ICD-10-CM

## 2018-10-04 PROCEDURE — 77063 BREAST TOMOSYNTHESIS BI: CPT

## 2018-10-04 PROCEDURE — 77067 SCR MAMMO BI INCL CAD: CPT

## 2018-12-10 ENCOUNTER — DOCUMENTATION (OUTPATIENT)
Dept: PSYCHIATRY | Facility: CLINIC | Age: 53
End: 2018-12-10

## 2018-12-11 ENCOUNTER — TELEPHONE (OUTPATIENT)
Dept: BEHAVIORAL/MENTAL HEALTH CLINIC | Facility: CLINIC | Age: 53
End: 2018-12-11

## 2018-12-11 NOTE — TELEPHONE ENCOUNTER
Looks like I could see her at 3:45/4:00PM this Thursday or at 12:30PM this Friday  I would prefer if it was the Thursday, but either day will work      Thanks,  Beni Umana
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
## 2018-12-12 ENCOUNTER — ANNUAL EXAM (OUTPATIENT)
Dept: OBGYN CLINIC | Facility: CLINIC | Age: 53
End: 2018-12-12
Payer: COMMERCIAL

## 2018-12-12 VITALS
BODY MASS INDEX: 25.58 KG/M2 | SYSTOLIC BLOOD PRESSURE: 132 MMHG | WEIGHT: 139 LBS | HEIGHT: 62 IN | DIASTOLIC BLOOD PRESSURE: 86 MMHG

## 2018-12-12 DIAGNOSIS — Z12.39 BREAST CANCER SCREENING: ICD-10-CM

## 2018-12-12 DIAGNOSIS — Z01.419 ENCOUNTER FOR ANNUAL ROUTINE GYNECOLOGICAL EXAMINATION: Primary | ICD-10-CM

## 2018-12-12 DIAGNOSIS — N95.2 VAGINAL ATROPHY: ICD-10-CM

## 2018-12-12 PROCEDURE — 87624 HPV HI-RISK TYP POOLED RSLT: CPT | Performed by: OBSTETRICS & GYNECOLOGY

## 2018-12-12 PROCEDURE — G0145 SCR C/V CYTO,THINLAYER,RESCR: HCPCS | Performed by: OBSTETRICS & GYNECOLOGY

## 2018-12-12 PROCEDURE — 99396 PREV VISIT EST AGE 40-64: CPT | Performed by: OBSTETRICS & GYNECOLOGY

## 2018-12-12 RX ORDER — ESTRADIOL 10 UG/1
10 INSERT VAGINAL 2 TIMES WEEKLY
Qty: 24 TABLET | Refills: 3 | Status: SHIPPED | OUTPATIENT
Start: 2018-12-13 | End: 2021-02-24 | Stop reason: SDUPTHER

## 2018-12-12 NOTE — PROGRESS NOTES
Assessment/Plan:    Pap smear done as well as annual   Encouraged self-breast examination as well as calcium supplementation  Continue annual mammogram   She will continue to follow-up with her primary care physician and psychiatrist as scheduled  Continue Vagifem them 1-2 times per week  Return to office in 1 year  No problem-specific Assessment & Plan notes found for this encounter  Diagnoses and all orders for this visit:    Encounter for annual routine gynecological examination    Breast cancer screening  -     Mammo screening bilateral w 3d & cad; Future    Vaginal atrophy  -     estradiol (VAGIFEM) 10 MCG TABS vaginal tablet; Insert 1 tablet (10 mcg total) into the vagina 2 (two) times a week          Subjective:      Patient ID: Anastacia Lozoya is a 52 y o  female  HPI     This is a 47-year-old female  ( x2) presents for her annual gyn exam   Patient went through menopause at age 44  She has never been on hormone replacement therapy  She denies any vaginal bleeding or spotting  There has been no changes in bowel or bladder function  She has been in a monogamous relationship with her  for over 25 years  All her Pap smears have been normal   She has had episodes of vaginal dryness which has improved since she has used Vagifem them  She continues to follow-up with her family physician on a regular basis as well as her psychiatrist     The following portions of the patient's history were reviewed and updated as appropriate: allergies, current medications, past family history, past medical history, past social history, past surgical history and problem list     Review of Systems   Constitutional: Negative for fatigue, fever and unexpected weight change  Respiratory: Negative for cough, chest tightness, shortness of breath and wheezing  Cardiovascular: Negative  Negative for chest pain and palpitations  Gastrointestinal: Negative    Negative for abdominal distention, abdominal pain, blood in stool, constipation, diarrhea, nausea and vomiting  Genitourinary: Negative  Negative for difficulty urinating, dyspareunia, dysuria, flank pain, frequency, genital sores, hematuria, pelvic pain, urgency, vaginal bleeding, vaginal discharge and vaginal pain  Skin: Negative for rash  Objective:      /86   Ht 5' 2" (1 575 m)   Wt 63 kg (139 lb)   Breastfeeding? No   BMI 25 42 kg/m²          Physical Exam   Constitutional: She appears well-developed and well-nourished  Cardiovascular: Normal rate and regular rhythm  Pulmonary/Chest: Effort normal and breath sounds normal  Right breast exhibits no inverted nipple, no mass, no nipple discharge, no skin change and no tenderness  Left breast exhibits no inverted nipple, no mass, no nipple discharge, no skin change and no tenderness  Abdominal: Soft  Bowel sounds are normal  She exhibits no distension  There is no tenderness  There is no rebound and no guarding  Genitourinary: Vagina normal and uterus normal  There is no lesion on the right labia  There is no lesion on the left labia  Cervix exhibits no discharge and no friability  Right adnexum displays no mass, no tenderness and no fullness  Left adnexum displays no mass, no tenderness and no fullness  No vaginal discharge found  Genitourinary Comments: The vagina is evident of slight estrogen deficiency  She does have a grade 1 rectocele  Rectovaginal exam is confirmatory

## 2018-12-19 LAB
HPV HR 12 DNA CVX QL NAA+PROBE: NEGATIVE
HPV16 DNA CVX QL NAA+PROBE: NEGATIVE
HPV18 DNA CVX QL NAA+PROBE: NEGATIVE

## 2018-12-21 ENCOUNTER — TELEPHONE (OUTPATIENT)
Dept: PSYCHIATRY | Facility: CLINIC | Age: 53
End: 2018-12-21

## 2018-12-21 DIAGNOSIS — F41.1 ANXIETY STATE: ICD-10-CM

## 2018-12-21 DIAGNOSIS — F33.1 RECURRENT MAJOR DEPRESSIVE EPISODES, MODERATE (HCC): ICD-10-CM

## 2018-12-21 RX ORDER — DULOXETIN HYDROCHLORIDE 60 MG/1
60 CAPSULE, DELAYED RELEASE ORAL DAILY
Qty: 90 CAPSULE | Refills: 0 | Status: SHIPPED | OUTPATIENT
Start: 2018-12-21 | End: 2019-04-30 | Stop reason: SDUPTHER

## 2018-12-26 ENCOUNTER — OFFICE VISIT (OUTPATIENT)
Dept: FAMILY MEDICINE CLINIC | Facility: CLINIC | Age: 53
End: 2018-12-26
Payer: COMMERCIAL

## 2018-12-26 VITALS
HEART RATE: 68 BPM | SYSTOLIC BLOOD PRESSURE: 130 MMHG | DIASTOLIC BLOOD PRESSURE: 78 MMHG | BODY MASS INDEX: 24.34 KG/M2 | WEIGHT: 137.4 LBS | HEIGHT: 63 IN | TEMPERATURE: 97 F | RESPIRATION RATE: 16 BRPM

## 2018-12-26 DIAGNOSIS — H61.23 EXCESSIVE CERUMEN IN BOTH EAR CANALS: Primary | ICD-10-CM

## 2018-12-26 PROCEDURE — 69209 REMOVE IMPACTED EAR WAX UNI: CPT | Performed by: FAMILY MEDICINE

## 2018-12-26 PROCEDURE — 99213 OFFICE O/P EST LOW 20 MIN: CPT | Performed by: FAMILY MEDICINE

## 2018-12-26 NOTE — ASSESSMENT & PLAN NOTE
-Ear cleaning performed as below, tolerated well  -Advised to use the Debrox ear drops more regularly - approx 1-2 times a week as preventative and daily if feeling symptomatic    -follow up as regularly scheduled with PCP

## 2018-12-26 NOTE — PROGRESS NOTES
Michaela Rascon 1965 female MRN: [id number]    Family Medicine Acute Visit    ASSESSMENT/PLAN  Problem List Items Addressed This Visit     Excessive cerumen in both ear canals - Primary     -Ear cleaning performed as below, tolerated well  -Advised to use the Debrox ear drops more regularly - approx 1-2 times a week as preventative and daily if feeling symptomatic    -follow up as regularly scheduled with PCP                     No future appointments  SUBJECTIVE  CC: Ear Fullness      HPI:  Michaela Rascon is a pleasant 52 y o  female who presents for acute visit for ears feeling clogged  Comes in about once a year for ear flushing - noticed recently feeling more clogged - both ears  Uses Debrox before cleaning but otherwise not regularly  [facility]  Review of Systems   Constitutional: Negative for chills and fever  HENT: Negative for congestion, postnasal drip and sore throat  Respiratory: Negative for cough, choking, shortness of breath and wheezing  Cardiovascular: Negative for chest pain and palpitations  Gastrointestinal: Negative for abdominal pain, diarrhea, nausea and vomiting  Genitourinary: Negative for decreased urine volume and difficulty urinating  Neurological: Negative for dizziness and light-headedness  Historical Information   The patient history was reviewed as follows:  Past Medical History:   Diagnosis Date    GERD (gastroesophageal reflux disease)     Hypertension     Restless legs          Past Surgical History:   Procedure Laterality Date    COLPOPEXY VAGINAL EXTRAPERITONEAL (VEC) WITH INSERTION PUBOVAGINAL SLING      NASAL SEPTUM SURGERY      SD COLONOSCOPY FLX DX W/COLLJ SPEC WHEN PFRMD N/A 6/14/2017    Procedure: COLONOSCOPY;  Surgeon: Ivet Abrams MD;  [facility];   Service: Gastroenterology    TONSILLECTOMY      TUBAL LIGATION       Family History   Problem Relation Age of Onset    Stroke Mother Cerebrovascular accident (CVA)    Depression Father         Major depressive disorder, recurrent episode, severe    Depression Sister         Major depressive disorder, recurrent episode, severe    Depression Paternal Aunt         Major depressive disorder, recurrent episode, severe      Social History   History   Alcohol Use No     Comment: Pt denies any h/o ETOH or illicit drug use or abuse  History   Drug Use No     Comment: Pt denies any h/o ETOH or illicit drug use or abuse  History   Smoking Status    Never Smoker   Smokeless Tobacco    Never Used       Medications:     Current Outpatient Prescriptions:     benazepril-hydrochlorthiazide (LOTENSIN HCT) 10-12 5 MG per tablet, Take 1 tablet by mouth daily, Disp: 90 tablet, Rfl: 3    DULoxetine (CYMBALTA) 60 mg delayed release capsule, Take 1 capsule (60 mg total) by mouth daily for 90 days, Disp: 90 capsule, Rfl: 0    estradiol (VAGIFEM) 10 MCG TABS vaginal tablet, Insert 1 tablet (10 mcg total) into the vagina 2 (two) times a week, Disp: 24 tablet, Rfl: 3    hydrOXYzine HCL (ATARAX) 25 mg tablet, Take 1 tab by mouth daily to twice daily as needed for anxiety or insomnia (Patient not taking: Reported on 9/4/2018 ), Disp: 180 tablet, Rfl: 0    predniSONE 20 mg tablet, Take 1 tablet (20 mg total) by mouth daily x5 days then 1/2 tab on day 6 & 7 (Patient not taking: Reported on 12/12/2018 ), Disp: 6 tablet, Rfl: 0    No Known Allergies    OBJECTIVE  Vitals:   Vitals:    12/26/18 0918   BP: 130/78   Pulse: 68   Resp: 16   Temp: (!) 97 °F (36 1 °C)   Weight: 62 3 kg (137 lb 6 4 oz)   Height: 5' 2 6" (1 59 m)           Physical Exam   Constitutional: She is oriented to person, place, and time  She appears well-developed and well-nourished  No distress  HENT:   Head: Normocephalic and atraumatic  Bilateral soft cerumen visualized not occluding ear drum  TM normal without redness, bulging, or drainage  Eyes: Right eye exhibits no discharge  Left eye exhibits no discharge  Pulmonary/Chest: Effort normal  No respiratory distress  Neurological: She is alert and oriented to person, place, and time  No cranial nerve deficit  Skin: She is not diaphoretic  Psychiatric: She has a normal mood and affect  Her behavior is normal  Judgment and thought content normal    Nursing note and vitals reviewed  Ear cerumen removal  Date/Time: 12/26/2018 9:45 AM  Performed by: Leticia Palacios by: Faith Hanks     Patient location:  Clinic  Indications / Diagnosis:  Excessive cerumen  Other Assisting Provider: No    Consent:     Consent obtained:  Verbal    Consent given by:  Patient    Risks discussed:  Bleeding, pain, incomplete removal and TM perforation    Alternatives discussed:  No treatment, alternative treatment, observation, referral and delayed treatment  Universal protocol:     Procedure explained and questions answered to patient or proxy's satisfaction: yes      Relevant documents present and verified: yes      Site/side marked: yes      Patient identity confirmed:  Verbally with patient  Procedure details:     Local anesthetic:  None    Location:  L ear and R ear    Procedure type: irrigation      Procedure type comment:  Both irrigation and curette  Post-procedure details:     Complication:  None    Hearing quality:  Normal    Patient tolerance of procedure:   Tolerated well, no immediate complications              Jerrica Valladares DO  12/26/2018

## 2018-12-28 LAB
LAB AP GYN PRIMARY INTERPRETATION: NORMAL
Lab: NORMAL

## 2019-04-26 ENCOUNTER — TELEPHONE (OUTPATIENT)
Dept: FAMILY MEDICINE CLINIC | Facility: CLINIC | Age: 54
End: 2019-04-26

## 2019-04-30 ENCOUNTER — OFFICE VISIT (OUTPATIENT)
Dept: FAMILY MEDICINE CLINIC | Facility: CLINIC | Age: 54
End: 2019-04-30

## 2019-04-30 VITALS
WEIGHT: 146.4 LBS | BODY MASS INDEX: 26.94 KG/M2 | HEIGHT: 62 IN | RESPIRATION RATE: 16 BRPM | TEMPERATURE: 98.1 F | DIASTOLIC BLOOD PRESSURE: 78 MMHG | SYSTOLIC BLOOD PRESSURE: 122 MMHG | HEART RATE: 84 BPM

## 2019-04-30 DIAGNOSIS — Z00.00 PHYSICAL EXAM: Primary | ICD-10-CM

## 2019-04-30 DIAGNOSIS — F33.1 RECURRENT MAJOR DEPRESSIVE EPISODES, MODERATE (HCC): ICD-10-CM

## 2019-04-30 DIAGNOSIS — I10 HTN (HYPERTENSION), BENIGN: ICD-10-CM

## 2019-04-30 DIAGNOSIS — F41.1 ANXIETY STATE: ICD-10-CM

## 2019-04-30 PROCEDURE — 99396 PREV VISIT EST AGE 40-64: CPT | Performed by: PHYSICIAN ASSISTANT

## 2019-04-30 RX ORDER — DULOXETIN HYDROCHLORIDE 60 MG/1
60 CAPSULE, DELAYED RELEASE ORAL DAILY
Qty: 90 CAPSULE | Refills: 0 | Status: SHIPPED | OUTPATIENT
Start: 2019-04-30 | End: 2022-03-31

## 2019-05-08 ENCOUNTER — TELEPHONE (OUTPATIENT)
Dept: FAMILY MEDICINE CLINIC | Facility: CLINIC | Age: 54
End: 2019-05-08

## 2019-05-09 ENCOUNTER — TELEPHONE (OUTPATIENT)
Dept: FAMILY MEDICINE CLINIC | Facility: CLINIC | Age: 54
End: 2019-05-09

## 2019-05-09 DIAGNOSIS — E78.5 HYPERLIPIDEMIA, UNSPECIFIED HYPERLIPIDEMIA TYPE: Primary | ICD-10-CM

## 2019-05-09 LAB
ALBUMIN SERPL-MCNC: 4.5 G/DL (ref 3.6–5.1)
ALBUMIN/GLOB SERPL: 2.3 (CALC) (ref 1–2.5)
ALP SERPL-CCNC: 56 U/L (ref 33–130)
ALT SERPL-CCNC: 20 U/L (ref 6–29)
AST SERPL-CCNC: 19 U/L (ref 10–35)
BASOPHILS # BLD AUTO: 29 CELLS/UL (ref 0–200)
BASOPHILS NFR BLD AUTO: 0.6 %
BILIRUB SERPL-MCNC: 0.3 MG/DL (ref 0.2–1.2)
BUN SERPL-MCNC: 29 MG/DL (ref 7–25)
BUN/CREAT SERPL: 32 (CALC) (ref 6–22)
CALCIUM SERPL-MCNC: 9.6 MG/DL (ref 8.6–10.4)
CHLORIDE SERPL-SCNC: 104 MMOL/L (ref 98–110)
CHOLEST SERPL-MCNC: 227 MG/DL
CHOLEST/HDLC SERPL: 3.4 (CALC)
CO2 SERPL-SCNC: 28 MMOL/L (ref 20–32)
CREAT SERPL-MCNC: 0.91 MG/DL (ref 0.5–1.05)
EOSINOPHIL # BLD AUTO: 149 CELLS/UL (ref 15–500)
EOSINOPHIL NFR BLD AUTO: 3.1 %
ERYTHROCYTE [DISTWIDTH] IN BLOOD BY AUTOMATED COUNT: 12.3 % (ref 11–15)
EST. AVERAGE GLUCOSE BLD GHB EST-MCNC: 123 (CALC)
EST. AVERAGE GLUCOSE BLD GHB EST-SCNC: 6.8 (CALC)
GLOBULIN SER CALC-MCNC: 2 G/DL (CALC) (ref 1.9–3.7)
GLUCOSE SERPL-MCNC: 94 MG/DL (ref 65–99)
HBA1C MFR BLD: 5.9 % OF TOTAL HGB
HCT VFR BLD AUTO: 35.6 % (ref 35–45)
HDLC SERPL-MCNC: 67 MG/DL
HGB BLD-MCNC: 12 G/DL (ref 11.7–15.5)
LDLC SERPL CALC-MCNC: 141 MG/DL (CALC)
LYMPHOCYTES # BLD AUTO: 1574 CELLS/UL (ref 850–3900)
LYMPHOCYTES NFR BLD AUTO: 32.8 %
MCH RBC QN AUTO: 29.8 PG (ref 27–33)
MCHC RBC AUTO-ENTMCNC: 33.7 G/DL (ref 32–36)
MCV RBC AUTO: 88.3 FL (ref 80–100)
MONOCYTES # BLD AUTO: 370 CELLS/UL (ref 200–950)
MONOCYTES NFR BLD AUTO: 7.7 %
NEUTROPHILS # BLD AUTO: 2678 CELLS/UL (ref 1500–7800)
NEUTROPHILS NFR BLD AUTO: 55.8 %
NONHDLC SERPL-MCNC: 160 MG/DL (CALC)
PLATELET # BLD AUTO: 250 THOUSAND/UL (ref 140–400)
PMV BLD REES-ECKER: 10.7 FL (ref 7.5–12.5)
POTASSIUM SERPL-SCNC: 5 MMOL/L (ref 3.5–5.3)
PROT SERPL-MCNC: 6.5 G/DL (ref 6.1–8.1)
RBC # BLD AUTO: 4.03 MILLION/UL (ref 3.8–5.1)
SL AMB EGFR AFRICAN AMERICAN: 82 ML/MIN/1.73M2
SL AMB EGFR NON AFRICAN AMERICAN: 71 ML/MIN/1.73M2
SODIUM SERPL-SCNC: 138 MMOL/L (ref 135–146)
TRIGL SERPL-MCNC: 89 MG/DL
WBC # BLD AUTO: 4.8 THOUSAND/UL (ref 3.8–10.8)

## 2019-05-09 RX ORDER — ROSUVASTATIN CALCIUM 5 MG/1
5 TABLET, COATED ORAL DAILY
Qty: 30 TABLET | Refills: 5 | Status: SHIPPED | OUTPATIENT
Start: 2019-05-09 | End: 2020-03-03

## 2019-06-12 ENCOUNTER — TRANSCRIBE ORDERS (OUTPATIENT)
Dept: LAB | Facility: CLINIC | Age: 54
End: 2019-06-12

## 2019-06-12 ENCOUNTER — APPOINTMENT (OUTPATIENT)
Dept: LAB | Facility: CLINIC | Age: 54
End: 2019-06-12
Payer: COMMERCIAL

## 2019-06-12 DIAGNOSIS — Z79.899 ENCOUNTER FOR LONG-TERM (CURRENT) USE OF OTHER MEDICATIONS: ICD-10-CM

## 2019-06-12 DIAGNOSIS — Z79.899 ENCOUNTER FOR LONG-TERM (CURRENT) USE OF OTHER MEDICATIONS: Primary | ICD-10-CM

## 2019-06-12 LAB
25(OH)D3 SERPL-MCNC: 19.6 NG/ML (ref 30–100)
T4 FREE SERPL-MCNC: 0.77 NG/DL (ref 0.76–1.46)
TSH SERPL DL<=0.05 MIU/L-ACNC: 0.91 UIU/ML (ref 0.36–3.74)

## 2019-06-12 PROCEDURE — 84443 ASSAY THYROID STIM HORMONE: CPT

## 2019-06-12 PROCEDURE — 84439 ASSAY OF FREE THYROXINE: CPT

## 2019-06-12 PROCEDURE — 36415 COLL VENOUS BLD VENIPUNCTURE: CPT

## 2019-06-12 PROCEDURE — 82306 VITAMIN D 25 HYDROXY: CPT

## 2019-10-11 DIAGNOSIS — I10 HTN (HYPERTENSION), BENIGN: ICD-10-CM

## 2019-11-20 DIAGNOSIS — I10 HTN (HYPERTENSION), BENIGN: ICD-10-CM

## 2020-01-07 ENCOUNTER — ANNUAL EXAM (OUTPATIENT)
Dept: OBGYN CLINIC | Facility: CLINIC | Age: 55
End: 2020-01-07
Payer: COMMERCIAL

## 2020-01-07 VITALS
SYSTOLIC BLOOD PRESSURE: 122 MMHG | DIASTOLIC BLOOD PRESSURE: 74 MMHG | BODY MASS INDEX: 26.05 KG/M2 | HEIGHT: 63 IN | WEIGHT: 147 LBS

## 2020-01-07 DIAGNOSIS — Z01.419 ENCOUNTER FOR ANNUAL ROUTINE GYNECOLOGICAL EXAMINATION: Primary | ICD-10-CM

## 2020-01-07 DIAGNOSIS — Z12.39 BREAST CANCER SCREENING: ICD-10-CM

## 2020-01-07 PROCEDURE — 99396 PREV VISIT EST AGE 40-64: CPT | Performed by: OBSTETRICS & GYNECOLOGY

## 2020-01-07 NOTE — PROGRESS NOTES
Assessment/Plan:  Pap done for cytology reflex HPV  Encouraged self-breast examination as well as calcium supplementation  Continue annual 3D mammogram   Discussed treatment options for vaginal atrophy, including over-the-counter vaginal moisturizers  She may consider continuing her vaginal estrogen  She will continue to follow-up with her family physician as scheduled  Return to office in 1 year or p r n  No problem-specific Assessment & Plan notes found for this encounter  Diagnoses and all orders for this visit:    Encounter for annual routine gynecological examination  -     Thinprep Tis Pap Reflex HPV mRNA E6/E7    Breast cancer screening  -     Mammo screening bilateral w 3d & cad; Future          Subjective:      Patient ID: Renetta Sanchez is a 62 y o  female  HPI     This is a 77-year-old female  ( x2, age 22, 19) presents for annual gyn exam   Patient went through menopause at age 44  She has never been on hormone replacement therapy  She denies any vaginal bleeding or spotting  There has been no changes in bowel or bladder function  She is sexually active and has been in a monogamous relationship with her  for over 26 years  Her Pap smears have been normal   She has had intermittent episodes of vaginal dryness during intercourse  She has use vaginal estrogen in the past year  She does admit to being non compliant  She continues to follow-up with her family physician on a regular basis  Colonoscopy was done 2017, within normal limits, follow-up in 10 years  The following portions of the patient's history were reviewed and updated as appropriate: allergies, current medications, past family history, past medical history, past social history, past surgical history and problem list     Review of Systems   Constitutional: Negative for fatigue, fever and unexpected weight change  Respiratory: Negative for cough, chest tightness, shortness of breath and wheezing  Cardiovascular: Negative  Negative for chest pain and palpitations  Gastrointestinal: Negative  Negative for abdominal distention, abdominal pain, blood in stool, constipation, diarrhea, nausea and vomiting  Genitourinary: Negative  Negative for difficulty urinating, dyspareunia, dysuria, flank pain, frequency, genital sores, hematuria, pelvic pain, urgency, vaginal bleeding, vaginal discharge and vaginal pain  Skin: Negative for rash  Objective:      /74   Ht 5' 3" (1 6 m)   Wt 66 7 kg (147 lb)   BMI 26 04 kg/m²          Physical Exam   Constitutional: She appears well-developed and well-nourished  Cardiovascular: Normal rate and regular rhythm  Pulmonary/Chest: Effort normal and breath sounds normal  Right breast exhibits no inverted nipple, no mass, no nipple discharge, no skin change and no tenderness  Left breast exhibits no inverted nipple, no mass, no nipple discharge, no skin change and no tenderness  Abdominal: Soft  Bowel sounds are normal  She exhibits no distension  There is no tenderness  There is no rebound and no guarding  Genitourinary: Vagina normal and uterus normal  There is no lesion on the right labia  There is no lesion on the left labia  Cervix exhibits no discharge and no friability  Right adnexum displays no mass, no tenderness and no fullness  Left adnexum displays no mass, no tenderness and no fullness  No vaginal discharge found  Genitourinary Comments: The vagina is evident of slight estrogen deficiency  Cervix is parous, tiny bleb at 3 oclock,friable  There is no pelvic floor prolapse  Rectovaginal exam is confirmatory

## 2020-01-09 LAB
CLINICAL INFO: NORMAL
CYTOLOGY CMNT CVX/VAG CYTO-IMP: NORMAL
DATE PREVIOUS BX: NORMAL
LMP START DATE: NORMAL
SL AMB PREV. PAP:: NORMAL
SPECIMEN SOURCE CVX/VAG CYTO: NORMAL
STAT OF ADQ CVX/VAG CYTO-IMP: NORMAL

## 2020-02-12 ENCOUNTER — HOSPITAL ENCOUNTER (OUTPATIENT)
Dept: RADIOLOGY | Age: 55
Discharge: HOME | End: 2020-02-12
Payer: COMMERCIAL

## 2020-02-12 VITALS — HEIGHT: 63 IN | BODY MASS INDEX: 26.05 KG/M2 | WEIGHT: 147 LBS

## 2020-02-12 DIAGNOSIS — Z12.39 BREAST CANCER SCREENING: ICD-10-CM

## 2020-02-12 PROCEDURE — 77063 BREAST TOMOSYNTHESIS BI: CPT

## 2020-02-12 PROCEDURE — 77067 SCR MAMMO BI INCL CAD: CPT

## 2020-02-21 ENCOUNTER — TELEPHONE (OUTPATIENT)
Dept: FAMILY MEDICINE CLINIC | Facility: CLINIC | Age: 55
End: 2020-02-21

## 2020-02-21 NOTE — TELEPHONE ENCOUNTER
Received request from [facility] to refill Rosuvastatin 5mg  Patient last seen 4/30/19, was asked to return in 6 months  Please call to schedule  Thanks!

## 2020-02-27 ENCOUNTER — APPOINTMENT (EMERGENCY)
Dept: RADIOLOGY | Facility: HOSPITAL | Age: 55
End: 2020-02-27
Payer: COMMERCIAL

## 2020-02-27 ENCOUNTER — APPOINTMENT (EMERGENCY)
Dept: MRI IMAGING | Facility: HOSPITAL | Age: 55
End: 2020-02-27
Payer: COMMERCIAL

## 2020-02-27 ENCOUNTER — OFFICE VISIT (OUTPATIENT)
Dept: URGENT CARE | Age: 55
End: 2020-02-27
Payer: COMMERCIAL

## 2020-02-27 ENCOUNTER — APPOINTMENT (EMERGENCY)
Dept: CT IMAGING | Facility: HOSPITAL | Age: 55
End: 2020-02-27
Payer: COMMERCIAL

## 2020-02-27 ENCOUNTER — HOSPITAL ENCOUNTER (EMERGENCY)
Facility: HOSPITAL | Age: 55
Discharge: HOME | End: 2020-02-27
Attending: EMERGENCY MEDICINE | Admitting: EMERGENCY MEDICINE
Payer: COMMERCIAL

## 2020-02-27 VITALS
SYSTOLIC BLOOD PRESSURE: 134 MMHG | BODY MASS INDEX: 25.69 KG/M2 | OXYGEN SATURATION: 100 % | DIASTOLIC BLOOD PRESSURE: 63 MMHG | WEIGHT: 145 LBS | RESPIRATION RATE: 12 BRPM | HEART RATE: 72 BPM | HEIGHT: 63 IN | TEMPERATURE: 98 F

## 2020-02-27 VITALS
OXYGEN SATURATION: 100 % | DIASTOLIC BLOOD PRESSURE: 55 MMHG | HEART RATE: 88 BPM | TEMPERATURE: 98 F | SYSTOLIC BLOOD PRESSURE: 119 MMHG | RESPIRATION RATE: 18 BRPM

## 2020-02-27 DIAGNOSIS — R20.0 LT FACIAL NUMBNESS: Primary | ICD-10-CM

## 2020-02-27 DIAGNOSIS — H53.9 VISION CHANGES: ICD-10-CM

## 2020-02-27 DIAGNOSIS — R20.0 FACIAL NUMBNESS: Primary | ICD-10-CM

## 2020-02-27 LAB
ANION GAP SERPL CALCULATED.3IONS-SCNC: 9 MMOL/L (ref 4–13)
BUN SERPL-MCNC: 21 MG/DL (ref 5–25)
CALCIUM SERPL-MCNC: 9.6 MG/DL (ref 8.3–10.1)
CHLORIDE SERPL-SCNC: 104 MMOL/L (ref 100–108)
CHOLEST SERPL-MCNC: 168 MG/DL (ref 50–200)
CO2 SERPL-SCNC: 28 MMOL/L (ref 21–32)
CREAT SERPL-MCNC: 1.01 MG/DL (ref 0.6–1.3)
ERYTHROCYTE [DISTWIDTH] IN BLOOD BY AUTOMATED COUNT: 11.9 % (ref 11.6–15.1)
EST. AVERAGE GLUCOSE BLD GHB EST-MCNC: 117 MG/DL
GFR SERPL CREATININE-BSD FRML MDRD: 62 ML/MIN/1.73SQ M
GLUCOSE SERPL-MCNC: 102 MG/DL (ref 65–140)
HBA1C MFR BLD: 5.7 %
HCT VFR BLD AUTO: 37.6 % (ref 34.8–46.1)
HDLC SERPL-MCNC: 71 MG/DL
HGB BLD-MCNC: 12.3 G/DL (ref 11.5–15.4)
INR PPP: 1.02 (ref 0.84–1.19)
LDLC SERPL CALC-MCNC: 80 MG/DL (ref 0–100)
MCH RBC QN AUTO: 30.2 PG (ref 26.8–34.3)
MCHC RBC AUTO-ENTMCNC: 32.7 G/DL (ref 31.4–37.4)
MCV RBC AUTO: 92 FL (ref 82–98)
PLATELET # BLD AUTO: 245 THOUSANDS/UL (ref 149–390)
PMV BLD AUTO: 10.2 FL (ref 8.9–12.7)
POTASSIUM SERPL-SCNC: 4.5 MMOL/L (ref 3.5–5.3)
PROTHROMBIN TIME: 12.8 SECONDS (ref 11.6–14.5)
RBC # BLD AUTO: 4.07 MILLION/UL (ref 3.81–5.12)
SODIUM SERPL-SCNC: 141 MMOL/L (ref 136–145)
SPECIMEN SOURCE: NORMAL
TRIGL SERPL-MCNC: 85 MG/DL
TROPONIN I BLD-MCNC: 0.01 NG/ML (ref 0–0.08)
TROPONIN I SERPL-MCNC: <0.02 NG/ML
TSH SERPL DL<=0.05 MIU/L-ACNC: 1.12 UIU/ML (ref 0.36–3.74)
WBC # BLD AUTO: 6.86 THOUSAND/UL (ref 4.31–10.16)

## 2020-02-27 PROCEDURE — 83036 HEMOGLOBIN GLYCOSYLATED A1C: CPT | Performed by: NURSE PRACTITIONER

## 2020-02-27 PROCEDURE — 70496 CT ANGIOGRAPHY HEAD: CPT

## 2020-02-27 PROCEDURE — 96360 HYDRATION IV INFUSION INIT: CPT

## 2020-02-27 PROCEDURE — 80061 LIPID PANEL: CPT | Performed by: NURSE PRACTITIONER

## 2020-02-27 PROCEDURE — 36415 COLL VENOUS BLD VENIPUNCTURE: CPT | Performed by: EMERGENCY MEDICINE

## 2020-02-27 PROCEDURE — 80048 BASIC METABOLIC PNL TOTAL CA: CPT | Performed by: EMERGENCY MEDICINE

## 2020-02-27 PROCEDURE — 70553 MRI BRAIN STEM W/O & W/DYE: CPT

## 2020-02-27 PROCEDURE — 93005 ELECTROCARDIOGRAM TRACING: CPT

## 2020-02-27 PROCEDURE — 84484 ASSAY OF TROPONIN QUANT: CPT

## 2020-02-27 PROCEDURE — 70498 CT ANGIOGRAPHY NECK: CPT

## 2020-02-27 PROCEDURE — 99215 OFFICE O/P EST HI 40 MIN: CPT | Performed by: PSYCHIATRY & NEUROLOGY

## 2020-02-27 PROCEDURE — 84484 ASSAY OF TROPONIN QUANT: CPT | Performed by: EMERGENCY MEDICINE

## 2020-02-27 PROCEDURE — 85610 PROTHROMBIN TIME: CPT | Performed by: EMERGENCY MEDICINE

## 2020-02-27 PROCEDURE — 99285 EMERGENCY DEPT VISIT HI MDM: CPT

## 2020-02-27 PROCEDURE — 84443 ASSAY THYROID STIM HORMONE: CPT | Performed by: NURSE PRACTITIONER

## 2020-02-27 PROCEDURE — 86618 LYME DISEASE ANTIBODY: CPT | Performed by: NURSE PRACTITIONER

## 2020-02-27 PROCEDURE — 71045 X-RAY EXAM CHEST 1 VIEW: CPT

## 2020-02-27 PROCEDURE — 85027 COMPLETE CBC AUTOMATED: CPT | Performed by: EMERGENCY MEDICINE

## 2020-02-27 PROCEDURE — 99284 EMERGENCY DEPT VISIT MOD MDM: CPT | Performed by: EMERGENCY MEDICINE

## 2020-02-27 PROCEDURE — 99213 OFFICE O/P EST LOW 20 MIN: CPT | Performed by: PHYSICIAN ASSISTANT

## 2020-02-27 PROCEDURE — A9585 GADOBUTROL INJECTION: HCPCS | Performed by: EMERGENCY MEDICINE

## 2020-02-27 RX ORDER — SODIUM CHLORIDE, SODIUM LACTATE, POTASSIUM CHLORIDE, CALCIUM CHLORIDE 600; 310; 30; 20 MG/100ML; MG/100ML; MG/100ML; MG/100ML
500 INJECTION, SOLUTION INTRAVENOUS CONTINUOUS
Status: DISCONTINUED | OUTPATIENT
Start: 2020-02-27 | End: 2020-02-27 | Stop reason: HOSPADM

## 2020-02-27 RX ORDER — DULOXETIN HYDROCHLORIDE 30 MG/1
90 CAPSULE, DELAYED RELEASE ORAL DAILY
COMMUNITY
End: 2022-03-31

## 2020-02-27 RX ORDER — ASPIRIN 81 MG/1
324 TABLET ORAL DAILY
Status: DISCONTINUED | OUTPATIENT
Start: 2020-02-28 | End: 2020-02-27 | Stop reason: HOSPADM

## 2020-02-27 RX ADMIN — SODIUM CHLORIDE, SODIUM LACTATE, POTASSIUM CHLORIDE, AND CALCIUM CHLORIDE 500 ML: .6; .31; .03; .02 INJECTION, SOLUTION INTRAVENOUS at 13:13

## 2020-02-27 RX ADMIN — GADOBUTROL 6 ML: 604.72 INJECTION INTRAVENOUS at 15:27

## 2020-02-27 RX ADMIN — IOHEXOL 85 ML: 350 INJECTION, SOLUTION INTRAVENOUS at 10:43

## 2020-02-27 NOTE — CONSULTS
Consultation - Stroke   [facility] 62 [facility] MRN: [id number]  Unit/Bed#: ED 06 Encounter: [id number]    Assessment/Plan   Assessment:   61 yo female with HTN, restless leg syndrome and GERD who presented to ED with sensory deficit in complete left side of face  She has some edema in left lower face with suspected trace flattening of nasolabial fold  Remainder of exam non-focal  Symptoms improving on follow up exam  MRI Brain with no acute intracranial abnormality  Etiology unclear  DDX complicated migraine without headache     TPA Decision: Patient not a TPA candidate  Symptoms resolved/clearly non disabling  Plan:   - Lyme pending  - recommend normotensive BP goal  - medical management per primary team  - no further neuro diagnostic testing recommended  - follow up with primary care physician  - See Attending Neurologist Attestation for further recommendations  Results reviewed:  - labs:TSH 1 20, HgA1c 5 7, LDL 80, HDL 71, Triglycerides 85, Troponin < 0 02  - MRI Brain w/so contrast: per report: Normal examination   - CTA head and neck stroke alert: per report: No evidence of significant stenosis, dissection or occlusion involving cervical carotid or vertebral segments or visualized cerebral arteries  No evidence of cerebral aneurysm or AVM  - CT Head wo contrast: per report: No significant brain parenchymal pathology identified    History of Present Illness     Reason for Consult / Principal Problem: left facial sensory deficit  Hx and PE limited by: none  Patient last known well:   Stroke alert called: 1025  Neurology time of arrival: 1025   HPI: Halie Montes is a 62 y o   female with HTN, GERD, and restless leg syndrome who presented to ED on 2/27/20 with left facial numbness and edema  Per patient her right eye was tearing x 2 days, then yesterday while she was looking at the computer both eyes seemed wet  Today she awoke with numbness in the left side of her face  She noticed some edema in the left lower face as well  She has no headache, visual loss, diplopia, eye pain, hearing impairment, speech or swallowing difficulties, numbness/tingling/weakness in extremities, SOB, chest pain, nausea or dyspepsia  Consult to Neurology  Consult performed by: JOHN Arreaga  Consult ordered by: Fiorella iFsher MD          Review of Systems    Historical Information   Past Medical History:   Diagnosis Date    Depression     GERD (gastroesophageal reflux disease)     Hypertension     Restless legs      Past Surgical History:   Procedure Laterality Date    COLPOPEXY VAGINAL EXTRAPERITONEAL (VEC) WITH INSERTION PUBOVAGINAL SLING      NASAL SEPTUM SURGERY      [place] COLONOSCOPY FLX DX W/COLLJ SPEC WHEN PFRMD N/A 6/14/2017    Procedure: COLONOSCOPY;  Surgeon: Juan Antonio Teixeira MD;  [facility]; Service: Gastroenterology    TONSILLECTOMY      TUBAL LIGATION       Social History   Social History     Substance and Sexual Activity   Alcohol Use No    Comment: Pt denies any h/o ETOH or illicit drug use or abuse  Social History     Substance and Sexual Activity   Drug Use No    Comment: Pt denies any h/o ETOH or illicit drug use or abuse  E-Cigarette/Vaping     E-Cigarette/Vaping Substances     Social History     Tobacco Use   Smoking Status Never Smoker   Smokeless Tobacco Never Used     Family History:   Family History   Problem Relation Age of Onset    Stroke Mother         Cerebrovascular accident (CVA)    Depression Father         Major depressive disorder, recurrent episode, severe    Colon cancer Father 68    Depression Sister         Major depressive disorder, recurrent episode, severe    Depression Paternal Aunt         Major depressive disorder, recurrent episode, severe    No Known Problems Sister     No Known Problems Maternal Aunt     No Known Problems Maternal Aunt        Review of previous medical records was  completed       Meds/Allergies all current active meds have been reviewed and PTA meds:   Prior to Admission Medications   Prescriptions Last Dose Informant Patient Reported? Taking? DULoxetine (CYMBALTA) 30 mg delayed release capsule   Yes No   Sig: Take 90 mg by mouth daily   DULoxetine (CYMBALTA) 60 mg delayed release capsule  Self No No   Sig: Take 1 capsule (60 mg total) by mouth daily for 130 days   Patient taking differently: Take 90 mg by mouth daily    benazepril-hydrochlorthiazide (LOTENSIN HCT) 10-12 5 MG per tablet   No No   Sig: Take 1 tablet by mouth daily   estradiol (VAGIFEM) 10 MCG TABS vaginal tablet   No No   Sig: Insert 1 tablet (10 mcg total) into the vagina 2 (two) times a week   Patient not taking: Reported on 2/27/2020   hydrOXYzine HCL (ATARAX) 25 mg tablet   No No   Sig: Take 1 tab by mouth daily to twice daily as needed for anxiety or insomnia   Patient not taking: Reported on 9/4/2018    predniSONE 20 mg tablet   No No   Sig: Take 1 tablet (20 mg total) by mouth daily x5 days then 1/2 tab on day 6 & 7   Patient not taking: Reported on 12/12/2018    rosuvastatin (CRESTOR) 5 mg tablet   No No   Sig: Take 1 tablet (5 mg total) by mouth daily      Facility-Administered Medications: None       No Known Allergies    Objective   Vitals:Blood pressure 137/79, pulse 62, temperature 98 °F (36 7 °C), resp  rate 12, height 5' 3" (1 6 m), weight 65 8 kg (145 lb), SpO2 100 %, not currently breastfeeding  ,Body mass index is 25 69 kg/m²  No intake or output data in the 24 hours ending 02/27/20 1105    Invasive Devices: Invasive Devices     None                 Physical Exam   Constitutional: She is oriented to person, place, and time  She appears well-developed and well-nourished  HENT:   Head: Normocephalic and atraumatic  Mouth/Throat: Oropharynx is clear and moist    Eyes: Pupils are equal, round, and reactive to light  Conjunctivae and EOM are normal  Right eye exhibits no discharge  Left eye exhibits no discharge  Neck: Normal range of motion  Cardiovascular: Normal rate, regular rhythm, normal heart sounds and intact distal pulses  Exam reveals no gallop and no friction rub  No murmur heard  Pulmonary/Chest: Effort normal and breath sounds normal  No stridor  No respiratory distress  She has no wheezes  She has no rales  Abdominal: Soft  Bowel sounds are normal  She exhibits no distension  Musculoskeletal: Normal range of motion  Neurological: She is alert and oriented to person, place, and time  She has normal strength  She has a normal Finger-Nose-Finger Test  Gait normal    Reflex Scores:       Tricep reflexes are 2+ on the right side and 2+ on the left side  Bicep reflexes are 2+ on the right side and 2+ on the left side  Brachioradialis reflexes are 2+ on the right side and 2+ on the left side  Patellar reflexes are 2+ on the right side and 2+ on the left side  Skin: Skin is warm and dry  No erythema  Psychiatric: She has a normal mood and affect  Her speech is normal and behavior is normal  Judgment and thought content normal      Neurologic Exam     Mental Status   Oriented to person, place, and time  Follows 3 step commands  Attention: normal  Concentration: normal    Speech: speech is normal   Level of consciousness: alert  Knowledge: good  Able to name object  Able to read  Able to repeat  Normal comprehension  Cranial Nerves     CN II   Visual fields full to confrontation  CN III, IV, VI   Pupils are equal, round, and reactive to light  Extraocular motions are normal    Right pupil: Size: 2 mm  Shape: regular  Reactivity: brisk  Left pupil: Size: 2 mm  Shape: regular  Reactivity: brisk  Nystagmus: none   Diplopia: none    CN V   Right facial sensation deficit: none  Left facial sensation deficit: complete (numbness novacaine feeling)    CN VII   Left facial weakness: trace flattening nasolabial fold, but patient with lower face edema   Pinprick intact, light tough decreased  CN VIII   Hearing: intact    CN IX, X   Palate: symmetric    CN XI   CN XI normal      CN XII   Tongue deviation: none    Motor Exam   Muscle bulk: normal  Overall muscle tone: normal  Right arm pronator drift: absent  Left arm pronator drift: absent    Strength   Strength 5/5 throughout  Sensory Exam   Light touch normal    Vibration normal    Proprioception normal      Gait, Coordination, and Reflexes     Gait  Gait: normal    Coordination   Finger to nose coordination: normal    Reflexes   Right brachioradialis: 2+  Left brachioradialis: 2+  Right biceps: 2+  Left biceps: 2+  Right triceps: 2+  Left triceps: 2+  Right patellar: 2+  Left patellar: 2+  Right plantar: normal  Left plantar: normal      NIHSS:  1a Level of Consciousness: 0 = Alert   1b  LOC Questions: 0 = Answers both correctly   1c  LOC Commands: 0 = Obeys both correctly   2  Best Gaze: 0 = Normal   3  Visual: 0 = No visual field loss   4  Facial Palsy: 1=Minor paralysis (flattened nasolabial fold, asymmetric on smiling)   5a  Motor Right Arm: 0=No drift, limb holds 90 (or 45) degrees for full 10 seconds   5b  Motor Left Arm: 0=No drift, limb holds 90 (or 45) degrees for full 10 seconds   6a  Motor Right Le=No drift, limb holds 90 (or 45) degrees for full 10 seconds   6b  Motor Left Le=No drift, limb holds 90 (or 45) degrees for full 10 seconds   7  Limb Ataxia:  0=Absent   8  Sensory: 1=Mild to moderate sensory loss; patient feels pinprick is less sharp or is dull on the affected side; there is a loss of superficial pain with pinprick but patient is aware She is being touched   9  Best Language:  0=No aphasia, normal   10  Dysarthria: 0=Normal articulation   11  Extinction and Inattention (formerly Neglect): 0=No abnormality   Total Score: 2     Time NIHSS was completed: 1106    Lab Results:   I have personally reviewed pertinent reports      CBC:   Results from last 7 days   Lab Units 20  1042   WBC Thousand/uL 6  86   RBC Million/uL 4 07   HEMOGLOBIN g/dL 12 3   HEMATOCRIT % 37 6   MCV fL 92   PLATELETS Thousands/uL 245   BMP/CMP:   Results from last 7 days   Lab Units 02/27/20  1042   SODIUM mmol/L 141   POTASSIUM mmol/L 4 5   CHLORIDE mmol/L 104   CO2 mmol/L 28   BUN mg/dL 21   CREATININE mg/dL 1 01   CALCIUM mg/dL 9 6   EGFR ml/min/1 73sq m 62   Coagulation:   Results from last 7 days   Lab Units 02/27/20  1042   INR  1 02     Imaging Studies: I have personally reviewed pertinent reports  and I have personally reviewed pertinent films in PACS     EKG, Pathology, and Other Studies: I have personally reviewed pertinent reports  and I have personally reviewed pertinent films in PACS     Code Status: No Order    Counseling / Coordination of Care  Total Critical Care time spent 50 minutes excluding procedures, teaching and family updates

## 2020-02-27 NOTE — PATIENT INSTRUCTIONS
Explained the importance of taking an ambulance  Patient refuses transport by ambulance, states can drive self  Alert, oriented, capable of making her own medical decisions and understands the risks of refusing ambulance transfer  She does agree to go to the ED for further evaluation and treatment     She would like to go to [facility] Emergency Department

## 2020-02-27 NOTE — ED PROCEDURE NOTE
PROCEDURE  CriticalCare Time  Performed by: Rosa Elena Starkey MD  Authorized by: Rosa Elena Starkey MD     Critical care provider statement:     Critical care time (minutes):  35    Critical care start time:  2/27/2020 11:00 AM    Critical care end time:  2/27/2020 11:35 AM    Critical care time was exclusive of:  Separately billable procedures and treating other patients and teaching time    Critical care was necessary to treat or prevent imminent or life-threatening deterioration of the following conditions:  CNS failure or compromise (acute stroke alert )    Critical care was time spent personally by me on the following activities:  Examination of patient, development of treatment plan with patient or surrogate, obtaining history from patient or surrogate, discussions with consultants, re-evaluation of patient's condition, ordering and review of radiographic studies and ordering and review of laboratory studies    I assumed direction of critical care for this patient from another provider in my specialty: no           Rosa Elena Starkey MD  02/27/20 9867

## 2020-02-27 NOTE — DISCHARGE INSTRUCTIONS
DIAGNOSIS; LEFT SIDED FACIAL NUMBNESS    - ACTIVITY AS TOLERATED     - PLEASE CALL YOUR PRIMARY DOCTOR TOMORROW TO SCHEDULE AN APPOINTMENT FOR A RECHECK WITHIN 1 WEEK    - PLEASE TAKE ALL OF YOUR MEDICATIONS AS BEFORE    - PLEASE RETURN TO  THE ER FOR ANY LEFT SIDED FACIAL/NECK/ REDNESS/SWELLING/ ANY FEVERS- TEMP > 100 4/ ANY LEFT SIDED FACIAL RASH -- ANY LEFT SIDED FACIAL WEAKNESS/ ANY ONE SIDED BODY WEAKNESS/ ANY DIFFICULTY TALKING/ SWALLOWING OR WITH BALANCE

## 2020-02-27 NOTE — PROGRESS NOTES
[facility]        NAME: William Young is a 62 y o  female  : 1965    MRN: [id number]  DATE: 2020  TIME: 8:59 AM    Assessment and Plan   Facial numbness [R20 0]  1  Facial numbness  Transfer to other facility   2  Vision changes  Transfer to other facility     Intermittent vision changes or her eyes feel hard to focus since yesterday, watering eyes, bilateral facial numbness and tingling, left side worse than the right  Discussed concerns at length with the patient  Patient refusing ambulance, like to go via private vehicle    Patient Instructions     Explained the importance of taking an ambulance  Patient refuses transport by ambulance, states can drive self  Alert, oriented, capable of making her own medical decisions and understands the risks of refusing ambulance transfer  She does agree to go to the ED for further evaluation and treatment  She would like to go to [facility]    Chief Complaint     Chief Complaint   Patient presents with    Eye Problem     right eye with tearing x 2 days ago, left eye tearing this AM, with eye sensitivty at work looking at computer screen     Numbness     x this AM, right side feels funny, but left side worse  with swelling noted left side, reports biting inside of left side of cheek yesterday , while chewing gum   denies N/V , denies any pain          History of Present Illness       57-year-old female presents for evaluation of bilateral eye watering and facial numbness  Patient states yesterday she was having some right eye watering, today started with left eye watering  She denies any eye pain or pain with eye movement  States yesterday she felt her vision was off she was having a hard time looking at the computer  States yesterday she was chewing gum and she did bite the inside of her left cheek, states it did not bother her at all    States this morning she woke up and her face felt Jeevan Howell, she states the left side feels more numb than the right side  Also noted some slight swelling to the left side of her face  She denies any other numbness or tingling  She denies any weakness  She denies any fevers or recent cough or cold-like symptoms  She denies any headaches, dizziness, light or sound sensitivity, nausea, vomiting, jaw pain, chest pain, chest tightness, wheezing, shortness of breath, urinary symptoms, or other complaints  Past medical history includes high blood pressure and high cholesterol, denies any history of strokes  She denies any rashes  Review of Systems   Review of Systems   Constitutional: Negative for activity change, appetite change, chills, fatigue and fever  HENT: Positive for facial swelling  Negative for congestion, dental problem, ear pain, hearing loss, postnasal drip, sinus pressure, sore throat, trouble swallowing and voice change  Eyes: Positive for discharge (bilateral watering eyes) and visual disturbance  Negative for photophobia and pain  Respiratory: Negative for cough, chest tightness, shortness of breath and wheezing  Cardiovascular: Negative for chest pain and palpitations  Gastrointestinal: Negative for abdominal pain, diarrhea, nausea and vomiting  Musculoskeletal: Negative for back pain, neck pain and neck stiffness  Neurological: Positive for numbness (bilateral facial, left worse than the right)  Negative for dizziness, syncope, weakness and headaches  All other systems reviewed and are negative          Current Medications       Current Outpatient Medications:     DULoxetine (CYMBALTA) 30 mg delayed release capsule, Take 90 mg by mouth daily, Disp: , Rfl:     benazepril-hydrochlorthiazide (LOTENSIN HCT) 10-12 5 MG per tablet, Take 1 tablet by mouth daily, Disp: 90 tablet, Rfl: 1    DULoxetine (CYMBALTA) 60 mg delayed release capsule, Take 1 capsule (60 mg total) by mouth daily for 130 days (Patient taking differently: Take 90 mg by mouth daily ), Disp: 90 capsule, Rfl: 0    estradiol (VAGIFEM) 10 MCG TABS vaginal tablet, Insert 1 tablet (10 mcg total) into the vagina 2 (two) times a week (Patient not taking: Reported on 2/27/2020), Disp: 24 tablet, Rfl: 3    hydrOXYzine HCL (ATARAX) 25 mg tablet, Take 1 tab by mouth daily to twice daily as needed for anxiety or insomnia (Patient not taking: Reported on 9/4/2018 ), Disp: 180 tablet, Rfl: 0    predniSONE 20 mg tablet, Take 1 tablet (20 mg total) by mouth daily x5 days then 1/2 tab on day 6 & 7 (Patient not taking: Reported on 12/12/2018 ), Disp: 6 tablet, Rfl: 0    rosuvastatin (CRESTOR) 5 mg tablet, Take 1 tablet (5 mg total) by mouth daily, Disp: 30 tablet, Rfl: 5    Current Allergies     Allergies as of 02/27/2020    (No Known Allergies)            The following portions of the patient's history were reviewed and updated as appropriate: allergies, current medications, past family history, past medical history, past social history, past surgical history and problem list      Past Medical History:   Diagnosis Date    Depression     GERD (gastroesophageal reflux disease)     Hypertension     Restless legs        Past Surgical History:   Procedure Laterality Date    COLPOPEXY VAGINAL EXTRAPERITONEAL (VEC) WITH INSERTION PUBOVAGINAL SLING      NASAL SEPTUM SURGERY      [place] COLONOSCOPY FLX DX W/COLLJ SPEC WHEN PFRMD N/A 6/14/2017    Procedure: COLONOSCOPY;  Surgeon: Jessica Meyers MD;  [facility];   Service: Gastroenterology    TONSILLECTOMY      TUBAL LIGATION         Family History   Problem Relation Age of Onset    Stroke Mother         Cerebrovascular accident (CVA)    Depression Father         Major depressive disorder, recurrent episode, severe    Colon cancer Father 68    Depression Sister         Major depressive disorder, recurrent episode, severe    Depression Paternal Aunt         Major depressive disorder, recurrent episode, severe    No Known Problems Sister     No Known Problems Maternal Aunt     No Known Problems Maternal Aunt          Medications have been verified  Objective   /55   Pulse 88   Temp 98 °F (36 7 °C)   Resp 18   SpO2 100%        Physical Exam     Physical Exam   Constitutional: She is oriented to person, place, and time  She appears well-developed and well-nourished  No distress  HENT:   Head: Normocephalic and atraumatic  Right Ear: Tympanic membrane normal  No hemotympanum  Left Ear: Tympanic membrane normal  No hemotympanum  Mouth/Throat: Uvula is midline, oropharynx is clear and moist and mucous membranes are normal  No trismus in the jaw  No uvula swelling  Small abrasion to the inside of the left cheek consistent with patient biting her cheek  There is no lacerations or anything to suture at this time, not through and through  Nontender to palpation  No bleeding or discharge   Eyes: Pupils are equal, round, and reactive to light  Conjunctivae and EOM are normal    No nystagmus, no pain with EOMs   Neck: Normal range of motion  Neck supple  No spinous process tenderness present  Normal range of motion present  Cardiovascular: Normal rate, regular rhythm and normal heart sounds  Pulmonary/Chest: Effort normal and breath sounds normal  She has no wheezes  Musculoskeletal: Normal range of motion  Neurological: She is alert and oriented to person, place, and time  She has normal strength and normal reflexes  She is not disoriented  She displays a negative Romberg sign  Coordination and gait normal  GCS eye subscore is 4  GCS verbal subscore is 5  GCS motor subscore is 6  Patient is reporting slightly decreased sensation to the left side of her face compared to the right  No other sensory deficits  Mild swelling to the left cheek compared to the right but nontender to palpation  No facial weakness  NV intact with sensation and strong peripheral pulses   5/5 strength of UE/LE bilaterally  Skin: Skin is warm and dry  Capillary refill takes less than 2 seconds  No rash noted  Psychiatric: She has a normal mood and affect  Her behavior is normal    Nursing note and vitals reviewed

## 2020-02-27 NOTE — ED PROCEDURE NOTE
PROCEDURE  ECG 12 Lead Documentation Only  Date/Time: 2/27/2020 12:10 PM  Performed by: Jayson Arzate MD  Authorized by: Jayson Arzate MD     Indications / Diagnosis:  Left facial numbness  ECG reviewed by me, the ED Provider: yes    Patient location:  ED and bedside  Previous ECG:     Previous ECG:  Unavailable    Comparison to cardiac monitor: Yes    Interpretation:     Interpretation: non-specific    Rate:     ECG rate:  68    ECG rate assessment: normal    Rhythm:     Rhythm: sinus rhythm    Ectopy:     Ectopy: none    QRS:     QRS axis:  Normal    QRS intervals:  Normal  Conduction:     Conduction: abnormal      Abnormal conduction: incomplete RBBB    ST segments:     ST segments:  Normal  T waves:     T waves: flattening      Flattening:  III, V1 and V2  Q waves:     Q waves:  V1 and V2  Other findings:     Other findings: U wave    Comments:      No ecg signs of ischemia/ injury / r heart strain / gregory/pericarditiws         Jayson Arzate MD  02/27/20 0312

## 2020-02-28 LAB — B BURGDOR IGG+IGM SER-ACNC: <0.91 ISR (ref 0–0.9)

## 2020-02-29 LAB
ATRIAL RATE: 71 BPM
P AXIS: 79 DEGREES
PR INTERVAL: 166 MS
QRS AXIS: -2 DEGREES
QRSD INTERVAL: 92 MS
QT INTERVAL: 406 MS
QTC INTERVAL: 432 MS
T WAVE AXIS: 45 DEGREES
VENTRICULAR RATE: 68 BPM

## 2020-02-29 PROCEDURE — 93010 ELECTROCARDIOGRAM REPORT: CPT | Performed by: INTERNAL MEDICINE

## 2020-03-01 NOTE — ED PROVIDER NOTES
History  Chief Complaint   Patient presents with    Facial Numbness     pt states started with 0630 this morning went to work co workers told pt to go to Urgent Care was sent to ER     62 yr female with hx of htn/hchol--  Yesterday  With r eye tearing- no eye pain=- change in vision / redness/ fb sensation/ injury -- thought might have bite left check yesterday -  this am at work with sense of left upper/mild/lower facial numbness with  Left lower mild facial droop- no recent or sudden severe/head neck pain -- no cp/sob/ palp/near syncope-- no  One sided body weakness/sensory comps- no current diplopia/ dysarthria/ dysphagia/ dysphonia/ dysmetria/ataxia - no other comps       History provided by:  Patient and spouse   used: No        Prior to Admission Medications   Prescriptions Last Dose Informant Patient Reported? Taking?    DULoxetine (CYMBALTA) 30 mg delayed release capsule   Yes No   Sig: Take 90 mg by mouth daily   DULoxetine (CYMBALTA) 60 mg delayed release capsule  Self No No   Sig: Take 1 capsule (60 mg total) by mouth daily for 130 days   Patient taking differently: Take 90 mg by mouth daily    benazepril-hydrochlorthiazide (LOTENSIN HCT) 10-12 5 MG per tablet   No No   Sig: Take 1 tablet by mouth daily   estradiol (VAGIFEM) 10 MCG TABS vaginal tablet   No No   Sig: Insert 1 tablet (10 mcg total) into the vagina 2 (two) times a week   Patient not taking: Reported on 2/27/2020   hydrOXYzine HCL (ATARAX) 25 mg tablet   No No   Sig: Take 1 tab by mouth daily to twice daily as needed for anxiety or insomnia   Patient not taking: Reported on 9/4/2018    predniSONE 20 mg tablet   No No   Sig: Take 1 tablet (20 mg total) by mouth daily x5 days then 1/2 tab on day 6 & 7   Patient not taking: Reported on 12/12/2018    rosuvastatin (CRESTOR) 5 mg tablet   No No   Sig: Take 1 tablet (5 mg total) by mouth daily      Facility-Administered Medications: None       Past Medical History:   Diagnosis Date    Depression     GERD (gastroesophageal reflux disease)     Hypertension     Restless legs        Past Surgical History:   Procedure Laterality Date    COLPOPEXY VAGINAL EXTRAPERITONEAL (VEC) WITH INSERTION PUBOVAGINAL SLING      NASAL SEPTUM SURGERY      MT COLONOSCOPY FLX DX W/COLLJ SPEC WHEN PFRMD N/A 6/14/2017    Procedure: COLONOSCOPY;  Surgeon: Corrie Valladares MD;  [facility]; Service: Gastroenterology    TONSILLECTOMY      TUBAL LIGATION         Family History   Problem Relation Age of Onset    Stroke Mother         Cerebrovascular accident (CVA)    Depression Father         Major depressive disorder, recurrent episode, severe    Colon cancer Father 68    Depression Sister         Major depressive disorder, recurrent episode, severe    Depression Paternal Aunt         Major depressive disorder, recurrent episode, severe    No Known Problems Sister     No Known Problems Maternal Aunt     No Known Problems Maternal Aunt      I have reviewed and agree with the history as documented  E-Cigarette/Vaping     E-Cigarette/Vaping Substances     Social History     Tobacco Use    Smoking status: Never Smoker    Smokeless tobacco: Never Used   Substance Use Topics    Alcohol use: No     Comment: Pt denies any h/o ETOH or illicit drug use or abuse   Drug use: No     Comment: Pt denies any h/o ETOH or illicit drug use or abuse  Review of Systems   Constitutional: Negative  HENT: Negative  Eyes: Negative  Respiratory: Negative  Cardiovascular: Negative  Gastrointestinal: Negative  Endocrine: Negative  Genitourinary: Negative  Musculoskeletal: Negative  Skin: Negative  Allergic/Immunologic: Negative  Neurological: Positive for facial asymmetry and numbness  Negative for dizziness, tremors, seizures, syncope, speech difficulty, weakness, light-headedness and headaches  Hematological: Negative  Psychiatric/Behavioral: Negative          Physical Exam  Physical Exam   Constitutional: She is oriented to person, place, and time  She appears well-developed and well-nourished  No distress  avss-- pulse ox 100 % on r a- interpretation is normal- no intervention- well appearing- in nad    HENT:   Head: Normocephalic and atraumatic  Right Ear: External ear normal    Left Ear: External ear normal    Nose: Nose normal    Mouth/Throat: Oropharynx is clear and moist  No oropharyngeal exudate  No bilateral temporal artery area edema/ erythema/ nodularity/ tenderness- no  bilateral tmj tenderness- no bilateral frontal /max sinus tenderness/edema/ erythema-- normal oropharnygeal exam - normal dental exam -- normal parotid/ submandibular /sublingual areas     - sym plate rise    Eyes: Pupils are equal, round, and reactive to light  Conjunctivae and EOM are normal  Right eye exhibits no discharge  Left eye exhibits no discharge  No scleral icterus  Neck: Normal range of motion  Neck supple  No JVD present  No tracheal deviation present  No thyromegaly present  No carotid bruits bilaterally    Cardiovascular: Normal rate, regular rhythm, normal heart sounds and intact distal pulses  Exam reveals no gallop and no friction rub  No murmur heard  Pulmonary/Chest: Effort normal and breath sounds normal  No stridor  No respiratory distress  She has no wheezes  She has no rales  She exhibits no tenderness  Abdominal: Soft  Bowel sounds are normal  She exhibits no distension and no mass  There is no tenderness  There is no rebound and no guarding  No hernia  Soft nt/nd- no hsm/ no peritoneal signs- no cva tenderness   Musculoskeletal: Normal range of motion  She exhibits no edema, tenderness or deformity  Equal bilateral radial/dp pulses- no ble edema/calf tenderness/asym/ erythema   Lymphadenopathy:     She has no cervical adenopathy  Neurological: She is alert and oriented to person, place, and time  A cranial nerve deficit and sensory deficit is present   She exhibits normal muscle tone  Coordination normal    MILD LEFT LOWER FACIAL ASYM ONLY- DECREASED SENSATION TO LIGHT TOUCH LEFT SIDE OF FACIAL V1/V2/V3 DISTRUBUTION -- NORMAL BUE/BLE STRENGTH/SENSATION - NO NYSTAGMUS- NEG TEST OF SCKEW- NORMAL FINGER TO NOSE ALL BILATERALLY    Skin: Skin is warm  Capillary refill takes less than 2 seconds  No rash noted  She is not diaphoretic  No erythema  No pallor  Psychiatric: She has a normal mood and affect  Her behavior is normal  Judgment and thought content normal    Nursing note and vitals reviewed        Vital Signs  ED Triage Vitals [02/27/20 0938]   Temperature Pulse Respirations Blood Pressure SpO2   98 °F (36 7 °C) 84 16 141/62 100 %      Temp src Heart Rate Source Patient Position - Orthostatic VS BP Location FiO2 (%)   -- -- -- -- --      Pain Score       No Pain           Vitals:    02/27/20 1310 02/27/20 1317 02/27/20 1318 02/27/20 1320   BP:  134/63 134/63    Pulse: 70  68 72         Visual Acuity  Visual Acuity      Most Recent Value   L Pupil Size (mm)  5   R Pupil Size (mm)  5          ED Medications  Medications   iohexol (OMNIPAQUE) 350 MG/ML injection (MULTI-DOSE) 85 mL (85 mL Intravenous Given 2/27/20 1043)   gadobutrol injection (MULTI-DOSE) SOLN 6 mL (6 mL Intravenous Given 2/27/20 1527)       Diagnostic Studies  Results Reviewed     Procedure Component Value Units Date/Time    Lyme Antibody Profile with reflex to [facility] [[id number]] Collected:  02/27/20 1238    Lab Status:  Final result Specimen:  Blood Updated:  02/28/20 1605     Lyme IgG/IgM Ab <0 91 ISR     Narrative:       Performed at:  [address] [place] [address]  [id number]  : Allen Alonso MD, Phone:  [phone]    Hemoglobin A1C [[id number]]  (Abnormal) Collected:  02/27/20 1042    Lab Status:  Final result Specimen:  Blood Updated:  02/27/20 1504     Hemoglobin A1C 5 7 %       mg/dl     Lipid Panel with Direct LDL reflex [[id number]]  (Normal) Collected: 02/27/20 1042    Lab Status:  Final result Specimen:  Blood Updated:  02/27/20 1259     Cholesterol 168 mg/dL      Triglycerides 85 mg/dL      HDL, Direct 71 mg/dL      LDL Calculated 80 mg/dL     TSH, 3rd generation [[id number]]  (Normal) Collected:  02/27/20 1042    Lab Status:  Final result Specimen:  Blood Updated:  02/27/20 1259     TSH 3RD GENERATON 1 120 uIU/mL     Narrative:       Patients undergoing fluorescein dye angiography may retain small amounts of fluorescein in the body for 48-72 hours post procedure  Samples containing fluorescein can produce falsely depressed TSH values  If the patient had this procedure,a specimen should be resubmitted post fluorescein clearance        Troponin I [[id number]]  (Normal) Collected:  02/27/20 1042    Lab Status:  Final result Specimen:  Blood Updated:  02/27/20 1106     Troponin I <0 02 ng/mL     Protime-INR [[id number]]  (Normal) Collected:  02/27/20 1042    Lab Status:  Final result Specimen:  Blood Updated:  02/27/20 1058     Protime 12 8 seconds      INR 1 02    CBC and Platelet [[id number]]  (Normal) Collected:  02/27/20 1042    Lab Status:  Final result Specimen:  Blood Updated:  02/27/20 1057     WBC 6 86 Thousand/uL      RBC 4 07 Million/uL      Hemoglobin 12 3 g/dL      Hematocrit 37 6 %      MCV 92 fL      MCH 30 2 pg      MCHC 32 7 g/dL      RDW 11 9 %      Platelets 345 Thousands/uL      MPV 10 2 fL     Basic metabolic panel [[id number]] Collected:  02/27/20 1042    Lab Status:  Final result Specimen:  Blood Updated:  02/27/20 1057     Sodium 141 mmol/L      Potassium 4 5 mmol/L      Chloride 104 mmol/L      CO2 28 mmol/L      ANION GAP 9 mmol/L      BUN 21 mg/dL      Creatinine 1 01 mg/dL      Glucose 102 mg/dL      Calcium 9 6 mg/dL      eGFR 62 ml/min/1 73sq m     Narrative:       [place] guidelines for Chronic Kidney Disease (CKD):     Stage 1 with normal or high GFR (GFR > 90 mL/min/1 73 square meters)    Stage 2 Mild CKD (GFR = 60-89 mL/min/1 73 square meters)    Stage 3A Moderate CKD (GFR = 45-59 mL/min/1 73 square meters)    Stage 3B Moderate CKD (GFR = 30-44 mL/min/1 73 square meters)    Stage 4 Severe CKD (GFR = 15-29 mL/min/1 73 square meters)    Stage 5 End Stage CKD (GFR <15 mL/min/1 73 square meters)  Note: GFR calculation is accurate only with a steady state creatinine    POCT troponin [[id number]] Collected:  02/27/20 1039    Lab Status:  Final result Specimen:  Venous Updated:  02/27/20 1053     POC Troponin I 0 01 ng/ml      Specimen Type VENOUS    Narrative:       [employer] i-Stat handheld analyzer 99% cutoff is > 0 08ng/mL in network Emergency Departments    o cTnI 99% cutoff is useful only when applied to patients in the clinical setting of myocardial ischemia  o cTnI 99% cutoff should be interpreted in the context of clinical history, ECG findings and possibly cardiac imaging to establish correct diagnosis  o cTnI 99% cutoff may be suggestive but clearly not indicative of a coronary event without the clinical setting of myocardial ischemia  MRI brain w wo contrast   Final Result by Ermias Conner MD (02/27 1545)      Normal examination  Workstation performed: [id number]         XR stroke alert portable chest   ED Interpretation by Luiz Simon MD (02/27 1207)   nad      Final Result by Radha Mckeon MD (02/27 1125)      No acute cardiopulmonary disease              Workstation performed: [id number]         CTA stroke alert (head/neck)   Final Result by Twila Garcia MD (02/27 1108)   No evidence of significant stenosis, dissection or occlusion involving cervical carotid or vertebral segments or visualized cerebral arteries      No evidence of cerebral aneurysm or AVM      Findings were directly discussed with Marcell Main on 2/27/2020 10:46 AM                      Workstation performed: [id number]         CT stroke alert brain   Final Result by Twila Garcia MD (02/27 1108)   No significant brain parenchymal pathology identified      Findings were directly discussed with Oleksandr Michael on 2/27/2020 10:45 AM             Workstation performed: [id number]                    Procedures  Procedures         ED Course  ED Course as of Mar 01 1757   Thu Feb 27, 2020   1025 Er md note- pt seen by er md - stoke score of 1-2-- neurology - dr Orlando Askew- neuro-  tiger texted- stroke alert called- neurology pa- in er- blackwell aware of pt       [address]- as per radiology read as normal- no acute findings      1207 Cxr portable- normal mediastinum/cardiac silhouette- no free/sq air/ no infiltratre/ ptx/ pulm edema/ pleural effusions      1209 - er md note- pt seen by neurology- have  put in for mri of  brain- pt is aware of this      1611 ER MD NOTE- PT- RE-EVLAUTED PRIRO TO ERD/C- D/W NEUROLOGY WHO ARE OK WITH D/C-- PRIOR TO ER D/C- PT WITH NORMAL NON FOCAL NEURO EXAM- NO FACIAL ASSYM-- NO OROPHARYNGEAL/ FACIAL SWELLING- REDNESS- WILL D/C                Stroke Assessment     Row Name 02/27/20 1012             NIH Stroke Scale    Interval  Other (Comment)      Level of Consciousness (1a )  0      LOC Questions (1b )  0      LOC Commands (1c )  0      Best Gaze (2 )  0      Visual (3 )  0      Facial Palsy (4 )  1      Motor Arm, Left (5a )  0      Motor Arm, Right (5b )  0      Motor Leg, Left (6a )  0      Motor Leg, Right (6b )  0      Limb Ataxia (7 )  0      Sensory (8 )  1      Best Language (9 )  0      Dysarthria (10 )  0      Extinction and Inattention (11 ) (Formerly Neglect)  0      Total  2          First Filed Value   TPA Decision  Patient not a TPA candidate  Patient is not a candidate options  Symptoms resolved/clearly non disabling                          MDM      Disposition  Final diagnoses:   Lt facial numbness     Time reflects when diagnosis was documented in both MDM as applicable and the Disposition within this note     Time User Action Codes Description Comment    2/27/2020 4:12 PM Wilver Nye Add [R20 0] Lt facial numbness       ED Disposition     ED Disposition Condition Date/Time Comment    Discharge Stable Thu Feb 27, 2020  4:12 PM Flor Orantes Galilea discharge to home/self care  Follow-up Information    None         Discharge Medication List as of 2/27/2020  4:15 PM      CONTINUE these medications which have NOT CHANGED    Details   benazepril-hydrochlorthiazide (LOTENSIN HCT) 10-12 5 MG per tablet Take 1 tablet by mouth daily, Starting Wed 11/20/2019, Normal      DULoxetine (CYMBALTA) 30 mg delayed release capsule Take 90 mg by mouth daily, Historical Med      estradiol (VAGIFEM) 10 MCG TABS vaginal tablet Insert 1 tablet (10 mcg total) into the vagina 2 (two) times a week, Starting Thu 12/13/2018, Normal      hydrOXYzine HCL (ATARAX) 25 mg tablet Take 1 tab by mouth daily to twice daily as needed for anxiety or insomnia, Normal      predniSONE 20 mg tablet Take 1 tablet (20 mg total) by mouth daily x5 days then 1/2 tab on day 6 & 7, Starting Tue 9/4/2018, Normal      rosuvastatin (CRESTOR) 5 mg tablet Take 1 tablet (5 mg total) by mouth daily, Starting Thu 5/9/2019, Normal           No discharge procedures on file      PDMP Review     None          ED Provider  Electronically Signed by           Pedro Marshall MD  03/01/20 6321

## 2020-03-03 ENCOUNTER — OFFICE VISIT (OUTPATIENT)
Dept: FAMILY MEDICINE CLINIC | Facility: CLINIC | Age: 55
End: 2020-03-03

## 2020-03-03 VITALS
BODY MASS INDEX: 25.87 KG/M2 | TEMPERATURE: 97.7 F | WEIGHT: 146 LBS | RESPIRATION RATE: 16 BRPM | DIASTOLIC BLOOD PRESSURE: 70 MMHG | SYSTOLIC BLOOD PRESSURE: 108 MMHG | HEART RATE: 72 BPM | HEIGHT: 63 IN

## 2020-03-03 DIAGNOSIS — F33.1 RECURRENT MAJOR DEPRESSIVE EPISODES, MODERATE (HCC): ICD-10-CM

## 2020-03-03 DIAGNOSIS — I10 HTN (HYPERTENSION), BENIGN: Primary | ICD-10-CM

## 2020-03-03 DIAGNOSIS — E78.5 HYPERLIPIDEMIA, UNSPECIFIED HYPERLIPIDEMIA TYPE: ICD-10-CM

## 2020-03-03 PROBLEM — R20.0 LEFT FACIAL NUMBNESS: Status: ACTIVE | Noted: 2020-03-03

## 2020-03-03 PROCEDURE — 1036F TOBACCO NON-USER: CPT | Performed by: PHYSICIAN ASSISTANT

## 2020-03-03 PROCEDURE — 3078F DIAST BP <80 MM HG: CPT | Performed by: PHYSICIAN ASSISTANT

## 2020-03-03 PROCEDURE — 3074F SYST BP LT 130 MM HG: CPT | Performed by: PHYSICIAN ASSISTANT

## 2020-03-03 PROCEDURE — 99214 OFFICE O/P EST MOD 30 MIN: CPT | Performed by: PHYSICIAN ASSISTANT

## 2020-03-03 PROCEDURE — 3008F BODY MASS INDEX DOCD: CPT | Performed by: PHYSICIAN ASSISTANT

## 2020-03-03 RX ORDER — ROSUVASTATIN CALCIUM 5 MG/1
5 TABLET, COATED ORAL DAILY
Qty: 90 TABLET | Refills: 2 | Status: SHIPPED | OUTPATIENT
Start: 2020-03-03 | End: 2020-09-08 | Stop reason: SDUPTHER

## 2020-03-03 NOTE — PROGRESS NOTES
Assessment/Plan:    Hyperlipidemia  Lipid panel- WNL  Continue Crestor 5 mg po qd  Discussed following healthy eating habits and exercise 3-5 days/week    HTN (hypertension), benign  Bp today-108/70  Continue lisinopril/HCTZ 10/12 5mg po qd      Left facial numbness  Resolved    Recurrent major depressive episodes, moderate (HCC)  Followed by psych- stable at this time  Maintain scheduled f/u appt with psych    BMI Counseling: Body mass index is 25 86 kg/m²  The BMI is above normal  Nutrition recommendations include reducing portion sizes, decreasing overall calorie intake, 3-5 servings of fruits/vegetables daily, reducing fast food intake, moderation in carbohydrate intake, reducing intake of saturated fat and trans fat and reducing intake of cholesterol  Exercise recommendations include exercising 3-5 times per week  Diagnoses and all orders for this visit:    HTN (hypertension), benign  -     benazepril-hydrochlorthiazide (LOTENSIN HCT) 10-12 5 MG per tablet; Take 1 tablet by mouth daily    Hyperlipidemia, unspecified hyperlipidemia type  -     rosuvastatin (CRESTOR) 5 mg tablet; Take 1 tablet (5 mg total) by mouth daily    Recurrent major depressive episodes, moderate (HCC)    Other orders  -     Cancel: Hepatitis C antibody; Future  -     Cancel: HIV 1/2 AG-AB combo; Future        Subjective:      Patient ID: Russell Burton is a 62 y o  female  HPI  Here today for f/u HTN/hyperlipidemia  HTN- taking lisinopril 10/12 5mg po qd w/o any adverse effects  Bp readings wnl  Hyperlipidemia- Taking Crestor 5 mg po qd, tolerating well w/o any adverse effects  Lipid panel 2/27/2020- wnl  Depression is followed by psych  Is currently on Cymbalta 90 mg p qd Feels her depression is well controlled on current dose  Has scheduled f/u appt this month  No SI/HI  Seen at the [facility] on 2/27/2020 after going to Urgent Care for left side facial numbness  Stroke workup unremarkable   She reports sx resolved the same day  The following portions of the patient's history were reviewed and updated as appropriate: allergies, current medications, past family history, past medical history, past social history, past surgical history and problem list     Review of Systems   Constitutional: Negative for chills, fatigue and fever  Respiratory: Negative for cough, chest tightness, shortness of breath and wheezing  Cardiovascular: Negative for chest pain and palpitations  Gastrointestinal: Negative for abdominal pain, constipation, diarrhea, nausea and vomiting  Genitourinary: Negative for difficulty urinating  Musculoskeletal: Negative for arthralgias, myalgias, neck pain and neck stiffness  Skin: Negative for rash and wound  Neurological: Negative for dizziness, weakness, light-headedness, numbness and headaches  Psychiatric/Behavioral: Negative for agitation and behavioral problems  The patient is not nervous/anxious  Objective:    /70 (BP Location: Left arm, Patient Position: Sitting, Cuff Size: Large)   Pulse 72   Temp 97 7 °F (36 5 °C) (Tympanic)   Resp 16   Ht 5' 3" (1 6 m)   Wt 66 2 kg (146 lb)   BMI 25 86 kg/m²      Physical Exam   Constitutional: She is oriented to person, place, and time  She appears well-developed and well-nourished  No distress  HENT:   Head: Normocephalic and atraumatic  Neck: Normal range of motion  Neck supple  Tracheal deviation present  Cardiovascular: Normal rate, regular rhythm and normal heart sounds  No murmur heard  Pulmonary/Chest: Effort normal and breath sounds normal  No respiratory distress  She has no wheezes  Musculoskeletal: She exhibits no edema or deformity  Neurological: She is alert and oriented to person, place, and time  Psychiatric: She has a normal mood and affect   Her behavior is normal  Thought content normal

## 2020-03-03 NOTE — PATIENT INSTRUCTIONS

## 2020-03-03 NOTE — ASSESSMENT & PLAN NOTE
Lipid panel- WNL  Continue Crestor 5 mg po qd  Discussed following healthy eating habits and exercise 3-5 days/week

## 2020-06-25 ENCOUNTER — TELEPHONE (OUTPATIENT)
Dept: FAMILY MEDICINE CLINIC | Facility: CLINIC | Age: 55
End: 2020-06-25

## 2020-06-26 ENCOUNTER — OFFICE VISIT (OUTPATIENT)
Dept: FAMILY MEDICINE CLINIC | Facility: CLINIC | Age: 55
End: 2020-06-26

## 2020-06-26 VITALS
SYSTOLIC BLOOD PRESSURE: 136 MMHG | TEMPERATURE: 99 F | HEIGHT: 63 IN | WEIGHT: 144.6 LBS | DIASTOLIC BLOOD PRESSURE: 70 MMHG | HEART RATE: 88 BPM | BODY MASS INDEX: 25.62 KG/M2 | RESPIRATION RATE: 16 BRPM

## 2020-06-26 DIAGNOSIS — H61.23 EXCESSIVE CERUMEN IN BOTH EAR CANALS: Primary | ICD-10-CM

## 2020-06-26 PROCEDURE — 99213 OFFICE O/P EST LOW 20 MIN: CPT | Performed by: FAMILY MEDICINE

## 2020-06-26 PROCEDURE — 3008F BODY MASS INDEX DOCD: CPT | Performed by: FAMILY MEDICINE

## 2020-06-26 PROCEDURE — 1036F TOBACCO NON-USER: CPT | Performed by: FAMILY MEDICINE

## 2020-06-26 PROCEDURE — 3075F SYST BP GE 130 - 139MM HG: CPT | Performed by: FAMILY MEDICINE

## 2020-06-26 PROCEDURE — 3078F DIAST BP <80 MM HG: CPT | Performed by: FAMILY MEDICINE

## 2020-09-08 DIAGNOSIS — E78.5 HYPERLIPIDEMIA, UNSPECIFIED HYPERLIPIDEMIA TYPE: ICD-10-CM

## 2020-09-08 RX ORDER — ROSUVASTATIN CALCIUM 5 MG/1
5 TABLET, COATED ORAL DAILY
Qty: 90 TABLET | Refills: 2 | Status: SHIPPED | OUTPATIENT
Start: 2020-09-08 | End: 2021-06-09 | Stop reason: SDUPTHER

## 2020-12-03 DIAGNOSIS — I10 HTN (HYPERTENSION), BENIGN: ICD-10-CM

## 2020-12-09 ENCOUNTER — OFFICE VISIT (OUTPATIENT)
Dept: FAMILY MEDICINE CLINIC | Facility: CLINIC | Age: 55
End: 2020-12-09

## 2020-12-09 VITALS
WEIGHT: 148.2 LBS | DIASTOLIC BLOOD PRESSURE: 88 MMHG | HEART RATE: 90 BPM | HEIGHT: 63 IN | SYSTOLIC BLOOD PRESSURE: 130 MMHG | TEMPERATURE: 97 F | BODY MASS INDEX: 26.26 KG/M2 | RESPIRATION RATE: 16 BRPM | OXYGEN SATURATION: 99 %

## 2020-12-09 DIAGNOSIS — E78.5 HYPERLIPIDEMIA, UNSPECIFIED HYPERLIPIDEMIA TYPE: Primary | ICD-10-CM

## 2020-12-09 DIAGNOSIS — I10 HTN (HYPERTENSION), BENIGN: ICD-10-CM

## 2020-12-09 DIAGNOSIS — F41.1 ANXIETY STATE: ICD-10-CM

## 2020-12-09 PROCEDURE — 3075F SYST BP GE 130 - 139MM HG: CPT | Performed by: PHYSICIAN ASSISTANT

## 2020-12-09 PROCEDURE — 3079F DIAST BP 80-89 MM HG: CPT | Performed by: PHYSICIAN ASSISTANT

## 2020-12-09 PROCEDURE — 1036F TOBACCO NON-USER: CPT | Performed by: PHYSICIAN ASSISTANT

## 2020-12-09 PROCEDURE — 3008F BODY MASS INDEX DOCD: CPT | Performed by: PHYSICIAN ASSISTANT

## 2020-12-09 PROCEDURE — 99213 OFFICE O/P EST LOW 20 MIN: CPT | Performed by: PHYSICIAN ASSISTANT

## 2020-12-09 PROCEDURE — 3725F SCREEN DEPRESSION PERFORMED: CPT | Performed by: PHYSICIAN ASSISTANT

## 2021-02-18 ENCOUNTER — HOSPITAL ENCOUNTER (OUTPATIENT)
Dept: RADIOLOGY | Age: 56
Discharge: HOME | End: 2021-02-18
Payer: COMMERCIAL

## 2021-02-18 VITALS — WEIGHT: 150 LBS | BODY MASS INDEX: 26.58 KG/M2 | HEIGHT: 63 IN

## 2021-02-18 DIAGNOSIS — Z12.31 ENCOUNTER FOR SCREENING MAMMOGRAM FOR MALIGNANT NEOPLASM OF BREAST: ICD-10-CM

## 2021-02-18 PROCEDURE — 77063 BREAST TOMOSYNTHESIS BI: CPT

## 2021-02-18 PROCEDURE — 77067 SCR MAMMO BI INCL CAD: CPT

## 2021-02-24 ENCOUNTER — ANNUAL EXAM (OUTPATIENT)
Dept: OBGYN CLINIC | Facility: CLINIC | Age: 56
End: 2021-02-24
Payer: COMMERCIAL

## 2021-02-24 VITALS
WEIGHT: 151 LBS | SYSTOLIC BLOOD PRESSURE: 122 MMHG | BODY MASS INDEX: 26.75 KG/M2 | DIASTOLIC BLOOD PRESSURE: 70 MMHG | HEIGHT: 63 IN

## 2021-02-24 DIAGNOSIS — Z01.419 ENCOUNTER FOR ANNUAL ROUTINE GYNECOLOGICAL EXAMINATION: Primary | ICD-10-CM

## 2021-02-24 DIAGNOSIS — Z12.39 ENCOUNTER FOR SCREENING FOR MALIGNANT NEOPLASM OF BREAST, UNSPECIFIED SCREENING MODALITY: ICD-10-CM

## 2021-02-24 DIAGNOSIS — N95.2 VAGINAL ATROPHY: ICD-10-CM

## 2021-02-24 PROCEDURE — 1036F TOBACCO NON-USER: CPT | Performed by: OBSTETRICS & GYNECOLOGY

## 2021-02-24 PROCEDURE — 99396 PREV VISIT EST AGE 40-64: CPT | Performed by: OBSTETRICS & GYNECOLOGY

## 2021-02-24 PROCEDURE — 3008F BODY MASS INDEX DOCD: CPT | Performed by: OBSTETRICS & GYNECOLOGY

## 2021-02-24 RX ORDER — MULTIVIT WITH MINERALS/LUTEIN
1000 TABLET ORAL DAILY
COMMUNITY
End: 2022-03-31

## 2021-02-24 RX ORDER — ESTRADIOL 10 UG/1
10 INSERT VAGINAL 2 TIMES WEEKLY
Qty: 24 TABLET | Refills: 3 | Status: SHIPPED | OUTPATIENT
Start: 2021-02-25 | End: 2022-03-31

## 2021-02-24 RX ORDER — ZINC GLUCONATE 50 MG
TABLET ORAL
COMMUNITY
End: 2022-03-31

## 2021-02-24 NOTE — PROGRESS NOTES
Assessment/Plan:    Pap smear deferred due to low risk status  Encouraged self-breast examination as well as calcium supplementation  Continue annual mammogram   Patient would like to restart Vagifem 2 times per week x1 year, vaginal atrophy  Reviewed colon cancer screening, up-to-date  She will continue to follow-up with her family physician as scheduled  Return to office in 1 year or p r n  No problem-specific Assessment & Plan notes found for this encounter  Diagnoses and all orders for this visit:    Encounter for annual routine gynecological examination    Encounter for screening for malignant neoplasm of breast, unspecified screening modality  -     Mammo screening bilateral w 3d & cad; Future    Vaginal atrophy  -     estradiol (Vagifem) 10 MCG TABS vaginal tablet; Insert 1 tablet (10 mcg total) into the vagina 2 (two) times a week    Other orders  -     Ascorbic Acid (vitamin C) 1000 MG tablet; Take 1,000 mg by mouth daily  -     Cholecalciferol (Vitamin D3) 1 25 MG (78378 UT) TABS; Take by mouth  -     Zinc 50 MG TABS; Take by mouth          Subjective:      Patient ID: Eder Laureano is a 60 y o  female  HPI      this is a very pleasant 77-year-old female  ( x2, age 23, 23) presents for annual gyn exam   Patient went through menopause at age 44  She has never been on hormone replacement therapy  She denies any vaginal bleeding or spotting  There has been no changes in bowel or bladder function  She has been in a monogamous relationship with her  for over 27 years  She does notice significant vaginal dryness  She has used Vagifem in the past with improvement  She has not used it in the last year and half and tried over-the-counter agents with no improvement  She did have a colonoscopy in 2017 normal with follow-up in 10 years  She does follow up with her family physician on a regular basis  Her Pap smears have been normal   Last Pap smear       The following portions of the patient's history were reviewed and updated as appropriate: allergies, current medications, past family history, past medical history, past social history, past surgical history and problem list     Review of Systems   Constitutional: Negative for fatigue, fever and unexpected weight change  Respiratory: Negative for cough, chest tightness, shortness of breath and wheezing  Cardiovascular: Negative  Negative for chest pain and palpitations  Gastrointestinal: Negative  Negative for abdominal distention, abdominal pain, blood in stool, constipation, diarrhea, nausea and vomiting  Genitourinary: Negative  Negative for difficulty urinating, dyspareunia, dysuria, flank pain, frequency, genital sores, hematuria, pelvic pain, urgency, vaginal bleeding, vaginal discharge and vaginal pain  Skin: Negative for rash  Objective:      /70   Ht 5' 3" (1 6 m)   Wt 68 5 kg (151 lb)   BMI 26 75 kg/m²          Physical Exam  Constitutional:       Appearance: Normal appearance  She is well-developed  Cardiovascular:      Rate and Rhythm: Normal rate and regular rhythm  Pulmonary:      Effort: Pulmonary effort is normal       Breath sounds: Normal breath sounds  Chest:      Breasts:         Right: No inverted nipple, mass, nipple discharge, skin change or tenderness  Left: No inverted nipple, mass, nipple discharge, skin change or tenderness  Abdominal:      General: Bowel sounds are normal  There is no distension  Palpations: Abdomen is soft  Tenderness: There is no abdominal tenderness  There is no guarding or rebound  Genitourinary:     Labia:         Right: No rash, tenderness or lesion  Left: No rash, tenderness or lesion  Vagina: Normal  No signs of injury  No vaginal discharge, tenderness or lesions  Cervix: No cervical motion tenderness, discharge, friability, lesion, erythema or cervical bleeding        Uterus: Not enlarged and not tender  Adnexa:         Right: No mass, tenderness or fullness  Left: No mass, tenderness or fullness  Comments:  External genitalia is within normal limits  The vagina is evident of slight estrogen deficiency  There is no pelvic floor prolapse  Rectovaginal exam is confirmatory  Skin:     General: Skin is warm and dry  Neurological:      Mental Status: She is alert and oriented to person, place, and time

## 2021-04-09 ENCOUNTER — IMMUNIZATIONS (OUTPATIENT)
Dept: FAMILY MEDICINE CLINIC | Facility: HOSPITAL | Age: 56
End: 2021-04-09

## 2021-06-09 ENCOUNTER — OFFICE VISIT (OUTPATIENT)
Dept: FAMILY MEDICINE CLINIC | Facility: CLINIC | Age: 56
End: 2021-06-09

## 2021-06-09 VITALS
BODY MASS INDEX: 27.04 KG/M2 | RESPIRATION RATE: 18 BRPM | WEIGHT: 152.6 LBS | HEART RATE: 94 BPM | SYSTOLIC BLOOD PRESSURE: 126 MMHG | TEMPERATURE: 97 F | OXYGEN SATURATION: 98 % | DIASTOLIC BLOOD PRESSURE: 90 MMHG | HEIGHT: 63 IN

## 2021-06-09 DIAGNOSIS — R73.09 ELEVATED HEMOGLOBIN A1C: ICD-10-CM

## 2021-06-09 DIAGNOSIS — I10 HTN (HYPERTENSION), BENIGN: ICD-10-CM

## 2021-06-09 DIAGNOSIS — E78.5 HYPERLIPIDEMIA, UNSPECIFIED HYPERLIPIDEMIA TYPE: ICD-10-CM

## 2021-06-09 DIAGNOSIS — Z00.00 ANNUAL PHYSICAL EXAM: Primary | ICD-10-CM

## 2021-06-09 DIAGNOSIS — Z11.59 ENCOUNTER FOR HEPATITIS C SCREENING TEST FOR LOW RISK PATIENT: ICD-10-CM

## 2021-06-09 PROCEDURE — 3008F BODY MASS INDEX DOCD: CPT | Performed by: PHYSICIAN ASSISTANT

## 2021-06-09 PROCEDURE — 3074F SYST BP LT 130 MM HG: CPT | Performed by: PHYSICIAN ASSISTANT

## 2021-06-09 PROCEDURE — 1036F TOBACCO NON-USER: CPT | Performed by: PHYSICIAN ASSISTANT

## 2021-06-09 PROCEDURE — 3080F DIAST BP >= 90 MM HG: CPT | Performed by: PHYSICIAN ASSISTANT

## 2021-06-09 PROCEDURE — 99396 PREV VISIT EST AGE 40-64: CPT | Performed by: PHYSICIAN ASSISTANT

## 2021-06-09 RX ORDER — ROSUVASTATIN CALCIUM 5 MG/1
5 TABLET, COATED ORAL DAILY
Qty: 90 TABLET | Refills: 2 | Status: SHIPPED | OUTPATIENT
Start: 2021-06-09

## 2021-06-09 NOTE — ASSESSMENT & PLAN NOTE
No abnormalities noted on exam  Will check routine labs  Last mammogram 2/2021-negative  Colonoscopy completed 06/2017 repeat in 10 yrs

## 2021-06-09 NOTE — PATIENT INSTRUCTIONS

## 2021-06-09 NOTE — PROGRESS NOTES
[address] [facility]    NAME: Joshua CordonnMcgregor  AGE: 60 y o  SEX: female  : 1965     DATE: 2021     Assessment and Plan:     Problem List Items Addressed This Visit        Cardiovascular and Mediastinum    HTN (hypertension), benign     Stable- refill  Benazepril / hydrochlorothiazide 10/12 5 mg qd         Relevant Medications    benazepril-hydrochlorthiazide (LOTENSIN HCT) 10-12 5 MG per tablet    Other Relevant Orders    Comprehensive metabolic panel       Other    Elevated hemoglobin A1c     Check hgba1c         Relevant Orders    Hemoglobin A1C    Hyperlipidemia     Continue Crestor 5 mg daily   Will check lipid panel         Relevant Medications    rosuvastatin (CRESTOR) 5 mg tablet    Other Relevant Orders    Lipid panel    Annual physical exam - Primary     No abnormalities noted on exam  Will check routine labs  Last mammogram 2021-negative  Colonoscopy completed 2017 repeat in 10 yrs         Relevant Orders    Lipid panel    CBC and Platelet    Comprehensive metabolic panel    Encounter for hepatitis C screening test for low risk patient      Check screening hepatitis-C         Relevant Orders    Hepatitis C antibody        BMI Counseling: Body mass index is 27 03 kg/m²  The BMI is above normal  Nutrition recommendations include reducing portion sizes, decreasing overall calorie intake, 3-5 servings of fruits/vegetables daily, reducing fast food intake, consuming healthier snacks, decreasing soda and/or juice intake and moderation in carbohydrate intake  Exercise recommendations include exercising 3-5 times per week  Immunizations and preventive care screenings were discussed with patient today  Appropriate education was printed on patient's after visit summary  Counseling:  · Exercise: the importance of regular exercise/physical activity was discussed   Recommend exercise 3-5 times per week for at least 30 minutes  Return in about 6 months (around 12/9/2021)  Chief Complaint:     Chief Complaint   Patient presents with    Physical Exam      History of Present Illness:     Adult Annual Physical   Patient here for a comprehensive physical exam  The patient reports no problems  Diet and Physical Activity  · Diet/Nutrition: Moderate fast food, fat, and carbs  · Exercise: no formal exercise  Depression Screening  PHQ-9 Depression Screening    PHQ-9:   Frequency of the following problems over the past two weeks:           General Health  · Sleep: sleeps well and gets 7-8 hours of sleep on average  · Hearing: significantly decreased - bilateral   · Vision: goes for regular eye exams and wears glasses  · Dental: regular dental visits, brushes teeth once daily and flosses daily  /GYN Health  · Patient is: postmenopausal  · Last menstrual period: n/a       Review of Systems:     Review of Systems   Constitutional: Negative for chills, fatigue and fever  HENT: Positive for tinnitus  Respiratory: Negative for cough, chest tightness, shortness of breath and wheezing  Cardiovascular: Negative for chest pain and palpitations  Gastrointestinal: Negative for abdominal pain, constipation, diarrhea, nausea and vomiting  Genitourinary: Negative for difficulty urinating  Musculoskeletal: Negative for arthralgias, myalgias, neck pain and neck stiffness  Skin: Negative for rash and wound  Neurological: Negative for dizziness, weakness, light-headedness, numbness and headaches  Psychiatric/Behavioral: Negative for agitation, behavioral problems, dysphoric mood and sleep disturbance  The patient is not nervous/anxious         Past Medical History:     Past Medical History:   Diagnosis Date    Depression     GERD (gastroesophageal reflux disease)     Hypertension     Restless legs       Past Surgical History:     Past Surgical History:   Procedure Laterality Date  COLPOPEXY VAGINAL EXTRAPERITONEAL (VEC) WITH INSERTION PUBOVAGINAL SLING      NASAL SEPTUM SURGERY      IA COLONOSCOPY FLX DX W/COLLJ SPEC WHEN PFRMD N/A 6/14/2017    Procedure: COLONOSCOPY;  Surgeon: Felipe Ohara MD;  [facility]; Service: Gastroenterology    TONSILLECTOMY      TUBAL LIGATION        Social History:     E-Cigarette/Vaping    E-Cigarette Use Never User      E-Cigarette/Vaping Substances    Nicotine No     THC No     CBD No     Flavoring No     Other No     Unknown No      Social History     Socioeconomic History    Marital status: /Civil Union     Spouse name: None    Number of children: 2    Years of education: None    Highest education level: None   Occupational History    Occupation: RN since 1985     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   Social Needs    Financial resource strain: Not very hard    Food insecurity     Worry: Never true     Inability: Never true    Transportation needs     Medical: No     Non-medical: No   Tobacco Use    Smoking status: Never Smoker    Smokeless tobacco: Never Used   Substance and Sexual Activity    Alcohol use: No     Comment: Pt denies any h/o ETOH or illicit drug use or abuse   Drug use: No     Comment: Pt denies any h/o ETOH or illicit drug use or abuse       Sexual activity: Yes     Partners: Male     Birth control/protection: Post-menopausal     Comment:    Lifestyle    Physical activity     Days per week: None     Minutes per session: None    Stress: None   Relationships    Social connections     Talks on phone: None     Gets together: None     Attends Rastafarian service: None     Active member of club or organization: None     Attends meetings of clubs or organizations: None     Relationship status: None    Intimate partner violence     Fear of current or ex partner: None     Emotionally abused: None     Physically abused: None     Forced sexual activity: None   Other Topics Concern    None   Social History Narrative     since 1993    2 children:  Sons born 56 [address] boy who was in the [facility] [employer] Induction program and was previously living with the family on weekends, came back to live with them as an adult to attend college in the [facility]  He has since moved out to his college dorm               Education:    Pt denies any h/o learning disabilities and childhood milestones were on time as far as she knows      Graduated [facility] school in [place] in [address]         Two sons born 1996 and 1999      Family History:     Family History   Problem Relation Age of Onset    Stroke Mother         Cerebrovascular accident (CVA)    Depression Father         Major depressive disorder, recurrent episode, severe    Colon cancer Father 68    Depression Sister         Major depressive disorder, recurrent episode, severe    Depression Paternal Aunt         Major depressive disorder, recurrent episode, severe    No Known Problems Sister     No Known Problems Maternal Aunt     No Known Problems Maternal Aunt       Current Medications:     Current Outpatient Medications   Medication Sig Dispense Refill    Ascorbic Acid (vitamin C) 1000 MG tablet Take 1,000 mg by mouth daily      benazepril-hydrochlorthiazide (LOTENSIN HCT) 10-12 5 MG per tablet Take 1 tablet by mouth daily 90 tablet 2    Cholecalciferol (Vitamin D3) 1 25 MG (37392 UT) TABS Take by mouth      DULoxetine (CYMBALTA) 30 mg delayed release capsule Take 90 mg by mouth daily      DULoxetine (CYMBALTA) 60 mg delayed release capsule Take 1 capsule (60 mg total) by mouth daily for 130 days (Patient taking differently: Take 90 mg by mouth daily ) 90 capsule 0    estradiol (Vagifem) 10 MCG TABS vaginal tablet Insert 1 tablet (10 mcg total) into the vagina 2 (two) times a week 24 tablet 3    rosuvastatin (CRESTOR) 5 mg tablet Take 1 tablet (5 mg total) by mouth daily 90 tablet 2    Zinc 50 MG TABS Take by mouth       No current facility-administered medications for this visit  Allergies:     No Known Allergies   Physical Exam:     /90 (BP Location: Left arm, Patient Position: Sitting, Cuff Size: Standard)   Pulse 94   Temp (!) 97 °F (36 1 °C) (Temporal)   Resp 18   Ht 5' 3" (1 6 m)   Wt 69 2 kg (152 lb 9 6 oz)   SpO2 98%   BMI 27 03 kg/m²     Physical Exam  Constitutional:       General: She is not in acute distress  Appearance: Normal appearance  She is not ill-appearing  HENT:      Head: Normocephalic and atraumatic  Right Ear: Ear canal and external ear normal  There is impacted cerumen  Left Ear: Tympanic membrane, ear canal and external ear normal  There is no impacted cerumen  Nose: Nose normal       Mouth/Throat:      Mouth: Mucous membranes are moist       Pharynx: No oropharyngeal exudate or posterior oropharyngeal erythema  Eyes:      General:         Right eye: No discharge  Left eye: No discharge  Conjunctiva/sclera: Conjunctivae normal       Pupils: Pupils are equal, round, and reactive to light  Neck:      Musculoskeletal: Normal range of motion and neck supple  No muscular tenderness  Cardiovascular:      Rate and Rhythm: Normal rate and regular rhythm  Pulses: Normal pulses  Heart sounds: Normal heart sounds  No murmur  Pulmonary:      Effort: Pulmonary effort is normal       Breath sounds: Normal breath sounds  No wheezing or rhonchi  Abdominal:      General: Bowel sounds are normal  There is no distension  Palpations: Abdomen is soft  There is no mass  Tenderness: There is no abdominal tenderness  There is no guarding  Musculoskeletal:         General: No swelling or deformity  Lymphadenopathy:      Cervical: No cervical adenopathy  Skin:     General: Skin is warm and dry  Findings: No erythema     Neurological:      Mental Status: She is alert and oriented to person, place, and time  Psychiatric:         Mood and Affect: Mood normal          Behavior: Behavior normal          Thought Content:  Thought content normal          Judgment: Judgment normal           Lizz Garcia PA-C  [address]

## 2021-08-12 ENCOUNTER — TELEMEDICINE (OUTPATIENT)
Dept: FAMILY MEDICINE CLINIC | Facility: CLINIC | Age: 56
End: 2021-08-12

## 2021-08-12 DIAGNOSIS — Z20.822 ENCOUNTER BY TELEHEALTH FOR SUSPECTED COVID-19: Primary | ICD-10-CM

## 2021-08-12 PROCEDURE — U0005 INFEC AGEN DETEC AMPLI PROBE: HCPCS | Performed by: PHYSICIAN ASSISTANT

## 2021-08-12 PROCEDURE — 99213 OFFICE O/P EST LOW 20 MIN: CPT | Performed by: PHYSICIAN ASSISTANT

## 2021-08-12 PROCEDURE — U0003 INFECTIOUS AGENT DETECTION BY NUCLEIC ACID (DNA OR RNA); SEVERE ACUTE RESPIRATORY SYNDROME CORONAVIRUS 2 (SARS-COV-2) (CORONAVIRUS DISEASE [COVID-19]), AMPLIFIED PROBE TECHNIQUE, MAKING USE OF HIGH THROUGHPUT TECHNOLOGIES AS DESCRIBED BY CMS-2020-01-R: HCPCS | Performed by: PHYSICIAN ASSISTANT

## 2021-08-12 NOTE — PROGRESS NOTES
COVID-19 Outpatient Progress Note    Assessment/Plan:    Problem List Items Addressed This Visit        Other    Encounter by telehealth for suspected COVID-19 - Primary     Possibly viral, pt received J&J COVID vaccine on 4/9/2021  No know contact with anyone positive for COVID  Will send for COVID testing today  May use OTC pain/fever reducer PRN  Increase po fluids and rest  Call office or ED for worsening sx           Relevant Orders    Novel Coronavirus (COVID-19), PCR [facility] Collected at   [facility] or [facility]         Disposition:     I referred patient to one of our centralized sites for a COVID-19 swab  I have spent 10 minutes directly with the patient  Verification of patient location:    Patient is located in the following state in which I hold an active license [place]    Encounter provider Luis De Jesus PA-C    Provider located at [address] [place] [address]   [phone]    Recent Visits  No visits were found meeting these conditions  Showing recent visits within past 7 days and meeting all other requirements  Today's Visits  Date Type Provider Dept   08/12/21 Telemedicine WILBERT Nicholas   Showing today's visits and meeting all other requirements  Future Appointments  No visits were found meeting these conditions  Showing future appointments within next 150 days and meeting all other requirements     This virtual check-in was done via [employer] and patient was informed that this is a secure, HIPAA-compliant platform  She agrees to proceed  Patient agrees to participate in a virtual check in via telephone or video visit instead of presenting to the office to address urgent/immediate medical needs  Patient is aware this is a billable service  After connecting through [facility], the patient was identified by name and date of birth   Elias Edmond was informed that this was a telemedicine visit and that the exam was being conducted confidentially over secure lines  My office door was closed  No one else was in the room  Delmy Wood acknowledged consent and understanding of privacy and security of the telemedicine visit  I informed the patient that I have reviewed her record in Epic and presented the opportunity for her to ask any questions regarding the visit today  The patient agreed to participate  Subjective:   Delmy Wood is a 60 y o  female who is concerned about COVID-19  Patient's symptoms include fever, chills, malaise, sore throat, cough, shortness of breath (Mild sob with exertion ), nausea, diarrhea and myalgias  Patient denies fatigue, rhinorrhea, anosmia, loss of taste, chest tightness, abdominal pain, vomiting and headaches       Date of symptom onset: 8/7/2021    Exposure:   Contact with a person who is under investigation (PUI) for or who is positive for COVID-19 within the last 14 days?: No    Hospitalized recently for fever and/or lower respiratory symptoms?: No      Currently a healthcare worker that is involved in direct patient care?: No      Works in a special setting where the risk of COVID-19 transmission may be high? (this may include long-term care, correctional and penitentiary facilities; homeless shelters; assisted-living facilities and group homes ): No      Resident in a special setting where the risk of COVID-19 transmission may be high? (this may include long-term care, correctional and penitentiary facilities; homeless shelters; assisted-living facilities and group homes ): No      Pt was vaccinated with J&J vaccine 4/9/2021    No results found for: Maurizio Zambrano, [address] [facility], [facility] [address]  Past Medical History:   Diagnosis Date    Depression     GERD (gastroesophageal reflux disease)     Hypertension     Restless legs      Past Surgical History:   Procedure Laterality Date    COLPOPEXY VAGINAL EXTRAPERITONEAL (VEC) WITH INSERTION PUBOVAGINAL SLING      NASAL SEPTUM SURGERY      ID COLONOSCOPY FLX DX W/COLLJ SPEC WHEN PFRMD N/A 6/14/2017    Procedure: COLONOSCOPY;  Surgeon: Ruby Ashley MD;  [facility]; Service: Gastroenterology    TONSILLECTOMY      TUBAL LIGATION       Current Outpatient Medications   Medication Sig Dispense Refill    Ascorbic Acid (vitamin C) 1000 MG tablet Take 1,000 mg by mouth daily      benazepril-hydrochlorthiazide (LOTENSIN HCT) 10-12 5 MG per tablet Take 1 tablet by mouth daily 90 tablet 2    Cholecalciferol (Vitamin D3) 1 25 MG (18777 UT) TABS Take by mouth      DULoxetine (CYMBALTA) 30 mg delayed release capsule Take 90 mg by mouth daily      DULoxetine (CYMBALTA) 60 mg delayed release capsule Take 1 capsule (60 mg total) by mouth daily for 130 days (Patient taking differently: Take 90 mg by mouth daily ) 90 capsule 0    estradiol (Vagifem) 10 MCG TABS vaginal tablet Insert 1 tablet (10 mcg total) into the vagina 2 (two) times a week 24 tablet 3    rosuvastatin (CRESTOR) 5 mg tablet Take 1 tablet (5 mg total) by mouth daily 90 tablet 2    Zinc 50 MG TABS Take by mouth       No current facility-administered medications for this visit  No Known Allergies    Review of Systems   Constitutional: Positive for chills and fever  Negative for fatigue  HENT: Positive for sore throat  Negative for rhinorrhea  Respiratory: Positive for cough and shortness of breath (Mild sob with exertion )  Negative for chest tightness  Gastrointestinal: Positive for diarrhea and nausea  Negative for abdominal pain and vomiting  Musculoskeletal: Positive for myalgias  Neurological: Negative for headaches  Objective: There were no vitals filed for this visit  Physical Exam  Constitutional:       General: She is not in acute distress  Appearance: Normal appearance  She is ill-appearing  She is not toxic-appearing  HENT:      Head: Normocephalic and atraumatic     Pulmonary: Effort: Pulmonary effort is normal  No respiratory distress  Neurological:      Mental Status: She is alert  Psychiatric:         Mood and Affect: Mood normal          Behavior: Behavior normal          Thought Content: Thought content normal          Judgment: Judgment normal          VIRTUAL VISIT DISCLAIMER    Gia Roudwight verbally agrees to participate in [employer]  Pt is aware that [employer] could be limited without vital signs or the ability to perform a full hands-on physical exam  Steffany Camacho understands she or the provider may request at any time to terminate the video visit and request the patient to seek care or treatment in person

## 2021-08-12 NOTE — ASSESSMENT & PLAN NOTE
Possibly viral, pt received J&J COVID vaccine on 4/9/2021  No know contact with anyone positive for COVID  Will send for COVID testing today  May use OTC pain/fever reducer PRN  Increase po fluids and rest  Call office or ED for worsening sx

## 2021-08-25 ENCOUNTER — TELEPHONE (OUTPATIENT)
Dept: FAMILY MEDICINE CLINIC | Facility: CLINIC | Age: 56
End: 2021-08-25

## 2021-08-25 NOTE — TELEPHONE ENCOUNTER
Pt states she received VM from you was just returning your call to let you know she has been feeling fine no more shortness of breath   She will be available today if you need to give her a call back

## 2021-11-10 DIAGNOSIS — I10 HTN (HYPERTENSION), BENIGN: ICD-10-CM

## 2021-11-10 NOTE — TELEPHONE ENCOUNTER
Pt moved to [place] in September and could not establish care at a new facility until January  And she is going to run out of BP medication in 3 days  She is requesting a refill for benazepril to be sent to pharmacy at [address] [place] [address], [address] [place] [address]  [phone]   Please advise pt

## 2022-03-30 ENCOUNTER — HOSPITAL ENCOUNTER (OUTPATIENT)
Dept: MAMMOGRAPHY | Facility: MEDICAL CENTER | Age: 57
Discharge: HOME | End: 2022-03-30
Payer: COMMERCIAL

## 2022-03-30 VITALS — HEIGHT: 63 IN | BODY MASS INDEX: 26.93 KG/M2 | WEIGHT: 152 LBS

## 2022-03-30 DIAGNOSIS — Z12.31 ENCOUNTER FOR SCREENING MAMMOGRAM FOR MALIGNANT NEOPLASM OF BREAST: ICD-10-CM

## 2022-03-30 PROCEDURE — 77063 BREAST TOMOSYNTHESIS BI: CPT

## 2022-03-30 PROCEDURE — 77067 SCR MAMMO BI INCL CAD: CPT

## 2022-03-31 ENCOUNTER — ANNUAL EXAM (OUTPATIENT)
Dept: OBGYN CLINIC | Facility: CLINIC | Age: 57
End: 2022-03-31
Payer: COMMERCIAL

## 2022-03-31 VITALS
BODY MASS INDEX: 26.75 KG/M2 | DIASTOLIC BLOOD PRESSURE: 74 MMHG | HEIGHT: 63 IN | WEIGHT: 151 LBS | SYSTOLIC BLOOD PRESSURE: 116 MMHG

## 2022-03-31 DIAGNOSIS — Z01.419 ENCOUNTER FOR ANNUAL ROUTINE GYNECOLOGICAL EXAMINATION: Primary | ICD-10-CM

## 2022-03-31 DIAGNOSIS — Z12.31 ENCOUNTER FOR SCREENING MAMMOGRAM FOR BREAST CANCER: ICD-10-CM

## 2022-03-31 DIAGNOSIS — N95.2 VAGINAL ATROPHY: ICD-10-CM

## 2022-03-31 PROCEDURE — 99396 PREV VISIT EST AGE 40-64: CPT | Performed by: OBSTETRICS & GYNECOLOGY

## 2022-03-31 PROCEDURE — 1036F TOBACCO NON-USER: CPT | Performed by: OBSTETRICS & GYNECOLOGY

## 2022-03-31 PROCEDURE — 3008F BODY MASS INDEX DOCD: CPT | Performed by: OBSTETRICS & GYNECOLOGY

## 2022-03-31 RX ORDER — ESTRADIOL 10 UG/1
10 INSERT VAGINAL 2 TIMES WEEKLY
Qty: 24 TABLET | Refills: 3 | Status: SHIPPED | OUTPATIENT
Start: 2022-03-31

## 2022-03-31 NOTE — PROGRESS NOTES
Assessment/Plan:  Pap smear done as well as annual   She would like to restart Vagifem 1-2 times per week  Encouraged self-breast examination as well as calcium supplementation  Continue annual mammogram   Reviewed colon cancer screening, up-to-date with colonoscopy  She continues to follow-up with her family physician on a regular basis  Return to office in 1 year or p r n  No problem-specific Assessment & Plan notes found for this encounter  Diagnoses and all orders for this visit:    Encounter for annual routine gynecological examination    Encounter for screening mammogram for breast cancer  -     Mammo screening bilateral w 3d & cad; Future    Vaginal atrophy  -     estradiol (Vagifem) 10 MCG TABS vaginal tablet; Insert 1 tablet (10 mcg total) into the vagina 2 (two) times a week          Subjective:      Patient ID: Arnold Soriano is a 60 y o  female  HPI     This is a very pleasant 43-year-old female  ( x2, age 20, 19) presents for gyn exam   She went through menopause at age 44  She has never been on hormone replacement therapy  She denies any vaginal bleeding or spotting  There has been no changes in bowel or bladder function  She has been in a monogamous relationship with her  for over 29 years  Pap smears have been normal   She has not been using Vagifem but does notice improvement if used  Colonoscopy done  with follow-up in 10 years  She is due for routine blood work over the next month  Patient resides in [place] and is working remotely for [employer] following portions of the patient's history were reviewed and updated as appropriate: allergies, current medications, past family history, past medical history, past social history, past surgical history and problem list     Review of Systems   Constitutional: Negative for fatigue, fever and unexpected weight change  Respiratory: Negative for cough, chest tightness, shortness of breath and wheezing  Cardiovascular: Negative  Negative for chest pain and palpitations  Gastrointestinal: Negative  Negative for abdominal distention, abdominal pain, blood in stool, constipation, diarrhea, nausea and vomiting  Genitourinary: Negative  Negative for difficulty urinating, dyspareunia, dysuria, flank pain, frequency, genital sores, hematuria, pelvic pain, urgency, vaginal bleeding, vaginal discharge and vaginal pain  Skin: Negative for rash  Objective:      /74   Ht 5' 3" (1 6 m)   Wt 68 5 kg (151 lb)   BMI 26 75 kg/m²          Physical Exam  Constitutional:       Appearance: Normal appearance  She is well-developed  Cardiovascular:      Rate and Rhythm: Normal rate and regular rhythm  Pulmonary:      Effort: Pulmonary effort is normal       Breath sounds: Normal breath sounds  Chest:   Breasts:      Right: No inverted nipple, mass, nipple discharge, skin change or tenderness  Left: No inverted nipple, mass, nipple discharge, skin change or tenderness  Abdominal:      General: Bowel sounds are normal  There is no distension  Palpations: Abdomen is soft  Tenderness: There is no abdominal tenderness  There is no guarding or rebound  Genitourinary:     Labia:         Right: No rash, tenderness or lesion  Left: No rash, tenderness or lesion  Vagina: Normal  No signs of injury  No vaginal discharge or tenderness  Cervix: No cervical motion tenderness, discharge, friability, lesion, erythema or cervical bleeding  Uterus: Not enlarged and not tender  Adnexa:         Right: No mass, tenderness or fullness  Left: No mass, tenderness or fullness  Comments: External genitalia is within normal limits  The vagina is evident of estrogen deficiency  There is no pelvic floor prolapse  Rectovaginal exam is confirmatory  Neurological:      Mental Status: She is alert and oriented to person, place, and time

## 2022-04-01 ENCOUNTER — APPOINTMENT (OUTPATIENT)
Dept: LAB | Facility: CLINIC | Age: 57
End: 2022-04-01
Payer: COMMERCIAL

## 2022-04-01 DIAGNOSIS — Z11.59 ENCOUNTER FOR HEPATITIS C SCREENING TEST FOR LOW RISK PATIENT: ICD-10-CM

## 2022-04-01 DIAGNOSIS — Z00.00 ANNUAL PHYSICAL EXAM: ICD-10-CM

## 2022-04-01 DIAGNOSIS — R73.09 ELEVATED HEMOGLOBIN A1C: ICD-10-CM

## 2022-04-01 DIAGNOSIS — E78.5 HYPERLIPIDEMIA, UNSPECIFIED HYPERLIPIDEMIA TYPE: ICD-10-CM

## 2022-04-01 DIAGNOSIS — I10 HTN (HYPERTENSION), BENIGN: ICD-10-CM

## 2022-04-01 LAB
ALBUMIN SERPL BCP-MCNC: 4 G/DL (ref 3.5–5)
ALP SERPL-CCNC: 49 U/L (ref 46–116)
ALT SERPL W P-5'-P-CCNC: 22 U/L (ref 12–78)
ANION GAP SERPL CALCULATED.3IONS-SCNC: 7 MMOL/L (ref 4–13)
AST SERPL W P-5'-P-CCNC: 16 U/L (ref 5–45)
BILIRUB SERPL-MCNC: 0.44 MG/DL (ref 0.2–1)
BUN SERPL-MCNC: 19 MG/DL (ref 5–25)
CALCIUM SERPL-MCNC: 9.2 MG/DL (ref 8.3–10.1)
CHLORIDE SERPL-SCNC: 105 MMOL/L (ref 100–108)
CHOLEST SERPL-MCNC: 169 MG/DL
CO2 SERPL-SCNC: 28 MMOL/L (ref 21–32)
CREAT SERPL-MCNC: 0.97 MG/DL (ref 0.6–1.3)
ERYTHROCYTE [DISTWIDTH] IN BLOOD BY AUTOMATED COUNT: 13 % (ref 11.6–15.1)
EST. AVERAGE GLUCOSE BLD GHB EST-MCNC: 126 MG/DL
GFR SERPL CREATININE-BSD FRML MDRD: 64 ML/MIN/1.73SQ M
GLUCOSE P FAST SERPL-MCNC: 109 MG/DL (ref 65–99)
HBA1C MFR BLD: 6 %
HCT VFR BLD AUTO: 37.7 % (ref 34.8–46.1)
HCV AB SER QL: NORMAL
HDLC SERPL-MCNC: 80 MG/DL
HGB BLD-MCNC: 12.3 G/DL (ref 11.5–15.4)
LDLC SERPL CALC-MCNC: 78 MG/DL (ref 0–100)
MCH RBC QN AUTO: 30.1 PG (ref 26.8–34.3)
MCHC RBC AUTO-ENTMCNC: 32.6 G/DL (ref 31.4–37.4)
MCV RBC AUTO: 92 FL (ref 82–98)
NONHDLC SERPL-MCNC: 89 MG/DL
PLATELET # BLD AUTO: 251 THOUSANDS/UL (ref 149–390)
PMV BLD AUTO: 10.4 FL (ref 8.9–12.7)
POTASSIUM SERPL-SCNC: 5 MMOL/L (ref 3.5–5.3)
PROT SERPL-MCNC: 7.3 G/DL (ref 6.4–8.2)
RBC # BLD AUTO: 4.09 MILLION/UL (ref 3.81–5.12)
SODIUM SERPL-SCNC: 140 MMOL/L (ref 136–145)
TRIGL SERPL-MCNC: 53 MG/DL
WBC # BLD AUTO: 5.43 THOUSAND/UL (ref 4.31–10.16)

## 2022-04-01 PROCEDURE — 86803 HEPATITIS C AB TEST: CPT

## 2022-04-01 PROCEDURE — 85027 COMPLETE CBC AUTOMATED: CPT

## 2022-04-01 PROCEDURE — 36415 COLL VENOUS BLD VENIPUNCTURE: CPT

## 2022-04-01 PROCEDURE — 80061 LIPID PANEL: CPT

## 2022-04-01 PROCEDURE — 83036 HEMOGLOBIN GLYCOSYLATED A1C: CPT

## 2022-04-01 PROCEDURE — 80053 COMPREHEN METABOLIC PANEL: CPT

## 2022-04-05 LAB
CLINICAL INFO: NORMAL
CYTOLOGY CMNT CVX/VAG CYTO-IMP: NORMAL
DATE PREVIOUS BX: NORMAL
HPV E6+E7 MRNA CVX QL NAA+PROBE: NOT DETECTED
LMP START DATE: NORMAL
SL AMB PREV. PAP:: NORMAL
SPECIMEN SOURCE CVX/VAG CYTO: NORMAL
STAT OF ADQ CVX/VAG CYTO-IMP: NORMAL

## 2022-04-12 ENCOUNTER — TELEPHONE (OUTPATIENT)
Dept: FAMILY MEDICINE CLINIC | Facility: CLINIC | Age: 57
End: 2022-04-12

## 2022-06-02 ENCOUNTER — TELEPHONE (OUTPATIENT)
Dept: FAMILY MEDICINE CLINIC | Facility: CLINIC | Age: 57
End: 2022-06-02

## 2022-06-02 DIAGNOSIS — I10 HTN (HYPERTENSION), BENIGN: ICD-10-CM

## 2022-06-02 NOTE — TELEPHONE ENCOUNTER
Pt has moved to [facility], [facility] , requesting refill she is in process of establishing with a new pcp   Has 2 days of pills left

## 2022-06-02 NOTE — TELEPHONE ENCOUNTER
Pt has moved to [place] area  [address] [facility] [address] will not be coming to our office anymore      Please remove us as PCP

## 2022-06-25 NOTE — TELEPHONE ENCOUNTER
06/25/22 6:27 PM     Thank you for your request  Your request has been received, reviewed, and the patient chart updated  The PCP has successfully been removed with a patient attribution note  This message will now be completed      Thank you  Didi Holder